# Patient Record
Sex: FEMALE | Race: WHITE | Employment: OTHER | RURAL
[De-identification: names, ages, dates, MRNs, and addresses within clinical notes are randomized per-mention and may not be internally consistent; named-entity substitution may affect disease eponyms.]

---

## 2019-05-27 ENCOUNTER — HOSPITAL ENCOUNTER (OUTPATIENT)
Facility: HOSPITAL | Age: 59
Discharge: HOME | End: 2019-05-27
Payer: COMMERCIAL

## 2019-05-27 LAB
A/G RATIO: 1.7 (ref 0.8–2)
ALBUMIN SERPL-MCNC: 4.3 G/DL (ref 3.4–4.8)
ALP BLD-CCNC: 163 U/L (ref 25–100)
ALT SERPL-CCNC: 14 U/L (ref 4–36)
ANION GAP SERPL CALCULATED.3IONS-SCNC: 14 MMOL/L (ref 3–16)
AST SERPL-CCNC: 18 U/L (ref 8–33)
BASOPHILS ABSOLUTE: 0 K/UL (ref 0–0.1)
BASOPHILS RELATIVE PERCENT: 0.5 %
BILIRUB SERPL-MCNC: 0.4 MG/DL (ref 0.3–1.2)
BUN BLDV-MCNC: 23 MG/DL (ref 6–20)
CALCIUM SERPL-MCNC: 9.4 MG/DL (ref 8.5–10.5)
CHLORIDE BLD-SCNC: 103 MMOL/L (ref 98–107)
CHOLESTEROL, TOTAL: 231 MG/DL (ref 0–200)
CO2: 23 MMOL/L (ref 20–30)
CREAT SERPL-MCNC: 1.4 MG/DL (ref 0.4–1.2)
EOSINOPHILS ABSOLUTE: 0.1 K/UL (ref 0–0.4)
EOSINOPHILS RELATIVE PERCENT: 1.6 %
FOLATE: 11.31 NG/ML
GFR AFRICAN AMERICAN: 47
GFR NON-AFRICAN AMERICAN: 39
GLOBULIN: 2.5 G/DL
GLUCOSE BLD-MCNC: 123 MG/DL (ref 74–106)
HBA1C MFR BLD: 5.6 %
HCT VFR BLD CALC: 39.8 % (ref 37–47)
HDLC SERPL-MCNC: 36 MG/DL (ref 40–60)
HEMOGLOBIN: 12.8 G/DL (ref 11.5–16.5)
IMMATURE GRANULOCYTES #: 0 K/UL
IMMATURE GRANULOCYTES %: 0.5 % (ref 0–5)
LDL CHOLESTEROL CALCULATED: 169 MG/DL
LYMPHOCYTES ABSOLUTE: 1.8 K/UL (ref 1.5–4)
LYMPHOCYTES RELATIVE PERCENT: 27.8 %
MCH RBC QN AUTO: 27.8 PG (ref 27–32)
MCHC RBC AUTO-ENTMCNC: 32.2 G/DL (ref 31–35)
MCV RBC AUTO: 86.5 FL (ref 80–100)
MONOCYTES ABSOLUTE: 0.3 K/UL (ref 0.2–0.8)
MONOCYTES RELATIVE PERCENT: 5 %
NEUTROPHILS ABSOLUTE: 4.2 K/UL (ref 2–7.5)
NEUTROPHILS RELATIVE PERCENT: 64.6 %
PDW BLD-RTO: 12.7 % (ref 11–16)
PLATELET # BLD: 276 K/UL (ref 150–400)
PMV BLD AUTO: 10.9 FL (ref 6–10)
POTASSIUM SERPL-SCNC: 4.4 MMOL/L (ref 3.4–5.1)
RBC # BLD: 4.6 M/UL (ref 3.8–5.8)
SEDIMENTATION RATE, ERYTHROCYTE: 20 MM/HR (ref 0–20)
SODIUM BLD-SCNC: 140 MMOL/L (ref 136–145)
TOTAL PROTEIN: 6.8 G/DL (ref 6.4–8.3)
TRIGL SERPL-MCNC: 129 MG/DL (ref 0–249)
TSH SERPL DL<=0.05 MIU/L-ACNC: 0.45 UIU/ML (ref 0.35–5.5)
URIC ACID, SERUM: 8 MG/DL (ref 2.5–7.1)
VITAMIN B-12: 324 PG/ML (ref 211–911)
VLDLC SERPL CALC-MCNC: 26 MG/DL
WBC # BLD: 6.4 K/UL (ref 4–11)

## 2019-05-27 PROCEDURE — 84550 ASSAY OF BLOOD/URIC ACID: CPT

## 2019-05-27 PROCEDURE — 36415 COLL VENOUS BLD VENIPUNCTURE: CPT

## 2019-05-27 PROCEDURE — 86038 ANTINUCLEAR ANTIBODIES: CPT

## 2019-05-27 PROCEDURE — 80061 LIPID PANEL: CPT

## 2019-05-27 PROCEDURE — 83036 HEMOGLOBIN GLYCOSYLATED A1C: CPT

## 2019-05-27 PROCEDURE — 84443 ASSAY THYROID STIM HORMONE: CPT

## 2019-05-27 PROCEDURE — 80053 COMPREHEN METABOLIC PANEL: CPT

## 2019-05-27 PROCEDURE — 86431 RHEUMATOID FACTOR QUANT: CPT

## 2019-05-27 PROCEDURE — 82607 VITAMIN B-12: CPT

## 2019-05-27 PROCEDURE — 85652 RBC SED RATE AUTOMATED: CPT

## 2019-05-27 PROCEDURE — 82746 ASSAY OF FOLIC ACID SERUM: CPT

## 2019-05-27 PROCEDURE — 85025 COMPLETE CBC W/AUTO DIFF WBC: CPT

## 2019-05-28 LAB — RHEUMATOID FACTOR: <10 IU/ML

## 2019-05-29 LAB — ANTI-NUCLEAR ANTIBODY (ANA): NEGATIVE

## 2020-01-27 ENCOUNTER — HOSPITAL ENCOUNTER (OUTPATIENT)
Facility: HOSPITAL | Age: 60
Discharge: HOME | End: 2020-01-27
Payer: COMMERCIAL

## 2020-01-27 LAB
A/G RATIO: 1.9 (ref 0.8–2)
ALBUMIN SERPL-MCNC: 4.6 G/DL (ref 3.4–4.8)
ALP BLD-CCNC: 156 U/L (ref 25–100)
ALT SERPL-CCNC: 11 U/L (ref 4–36)
ANION GAP SERPL CALCULATED.3IONS-SCNC: 10 MMOL/L (ref 3–16)
AST SERPL-CCNC: 18 U/L (ref 8–33)
BASOPHILS ABSOLUTE: 0 K/UL (ref 0–0.1)
BASOPHILS RELATIVE PERCENT: 0.6 %
BILIRUB SERPL-MCNC: 0.4 MG/DL (ref 0.3–1.2)
BUN BLDV-MCNC: 28 MG/DL (ref 6–20)
CALCIUM SERPL-MCNC: 9.4 MG/DL (ref 8.5–10.5)
CHLORIDE BLD-SCNC: 105 MMOL/L (ref 98–107)
CHOLESTEROL, TOTAL: 259 MG/DL (ref 0–200)
CO2: 25 MMOL/L (ref 20–30)
CREAT SERPL-MCNC: 1.3 MG/DL (ref 0.4–1.2)
EOSINOPHILS ABSOLUTE: 0.1 K/UL (ref 0–0.4)
EOSINOPHILS RELATIVE PERCENT: 2.3 %
FOLATE: 10.69 NG/ML
GFR AFRICAN AMERICAN: 51
GFR NON-AFRICAN AMERICAN: 42
GLOBULIN: 2.4 G/DL
GLUCOSE BLD-MCNC: 92 MG/DL (ref 74–106)
HBA1C MFR BLD: 5.9 %
HCT VFR BLD CALC: 39.2 % (ref 37–47)
HDLC SERPL-MCNC: 44 MG/DL (ref 40–60)
HEMOGLOBIN: 12.3 G/DL (ref 11.5–16.5)
IMMATURE GRANULOCYTES #: 0 K/UL
IMMATURE GRANULOCYTES %: 0.4 % (ref 0–5)
LDL CHOLESTEROL CALCULATED: 195 MG/DL
LYMPHOCYTES ABSOLUTE: 1.5 K/UL (ref 1.5–4)
LYMPHOCYTES RELATIVE PERCENT: 30.6 %
MCH RBC QN AUTO: 28.1 PG (ref 27–32)
MCHC RBC AUTO-ENTMCNC: 31.4 G/DL (ref 31–35)
MCV RBC AUTO: 89.5 FL (ref 80–100)
MONOCYTES ABSOLUTE: 0.3 K/UL (ref 0.2–0.8)
MONOCYTES RELATIVE PERCENT: 6.6 %
NEUTROPHILS ABSOLUTE: 2.9 K/UL (ref 2–7.5)
NEUTROPHILS RELATIVE PERCENT: 59.5 %
PDW BLD-RTO: 13 % (ref 11–16)
PLATELET # BLD: 256 K/UL (ref 150–400)
PMV BLD AUTO: 11.6 FL (ref 6–10)
POTASSIUM SERPL-SCNC: 4.2 MMOL/L (ref 3.4–5.1)
RBC # BLD: 4.38 M/UL (ref 3.8–5.8)
SODIUM BLD-SCNC: 140 MMOL/L (ref 136–145)
TOTAL PROTEIN: 7 G/DL (ref 6.4–8.3)
TRIGL SERPL-MCNC: 98 MG/DL (ref 0–249)
TSH SERPL DL<=0.05 MIU/L-ACNC: 0.25 UIU/ML (ref 0.35–5.5)
VITAMIN B-12: 290 PG/ML (ref 211–911)
VITAMIN D 25-HYDROXY: 14.4 (ref 32–100)
VLDLC SERPL CALC-MCNC: 20 MG/DL
WBC # BLD: 4.8 K/UL (ref 4–11)

## 2020-01-27 PROCEDURE — 83036 HEMOGLOBIN GLYCOSYLATED A1C: CPT

## 2020-01-27 PROCEDURE — 83001 ASSAY OF GONADOTROPIN (FSH): CPT

## 2020-01-27 PROCEDURE — 82607 VITAMIN B-12: CPT

## 2020-01-27 PROCEDURE — 83002 ASSAY OF GONADOTROPIN (LH): CPT

## 2020-01-27 PROCEDURE — 82306 VITAMIN D 25 HYDROXY: CPT

## 2020-01-27 PROCEDURE — 80061 LIPID PANEL: CPT

## 2020-01-27 PROCEDURE — 36415 COLL VENOUS BLD VENIPUNCTURE: CPT

## 2020-01-27 PROCEDURE — 84443 ASSAY THYROID STIM HORMONE: CPT

## 2020-01-27 PROCEDURE — 82746 ASSAY OF FOLIC ACID SERUM: CPT

## 2020-01-27 PROCEDURE — 80053 COMPREHEN METABOLIC PANEL: CPT

## 2020-01-27 PROCEDURE — 85025 COMPLETE CBC W/AUTO DIFF WBC: CPT

## 2020-01-27 PROCEDURE — 82671 ASSAY OF ESTROGENS: CPT

## 2020-01-28 LAB
FOLLICLE STIMULATING HORMONE: 136.4 MIU/ML
LUTEINIZING HORMONE: 72.3 MIU/ML

## 2020-01-31 LAB
ESTRADIOL LEVEL: 13.3 PG/ML
ESTROGEN TOTAL: 40.7 PG/ML
ESTRONE: 27.4 PG/ML

## 2020-03-06 ENCOUNTER — HOSPITAL ENCOUNTER (OUTPATIENT)
Facility: HOSPITAL | Age: 60
Discharge: HOME | End: 2020-03-06
Payer: COMMERCIAL

## 2020-03-06 ENCOUNTER — CONSULT (OUTPATIENT)
Dept: CARDIOLOGY | Facility: CLINIC | Age: 60
End: 2020-03-06

## 2020-03-06 VITALS
HEART RATE: 62 BPM | RESPIRATION RATE: 18 BRPM | HEIGHT: 62 IN | SYSTOLIC BLOOD PRESSURE: 124 MMHG | BODY MASS INDEX: 43.98 KG/M2 | DIASTOLIC BLOOD PRESSURE: 80 MMHG | OXYGEN SATURATION: 99 % | WEIGHT: 239 LBS

## 2020-03-06 DIAGNOSIS — I44.7 LBBB (LEFT BUNDLE BRANCH BLOCK): ICD-10-CM

## 2020-03-06 DIAGNOSIS — R53.83 FATIGUE, UNSPECIFIED TYPE: ICD-10-CM

## 2020-03-06 DIAGNOSIS — G47.9 SLEEP DISTURBANCE: ICD-10-CM

## 2020-03-06 DIAGNOSIS — I20.8 ANGINAL EQUIVALENT (HCC): Primary | ICD-10-CM

## 2020-03-06 DIAGNOSIS — E78.2 MIXED HYPERLIPIDEMIA: ICD-10-CM

## 2020-03-06 PROBLEM — E05.90 HYPERTHYROIDISM: Status: ACTIVE | Noted: 2020-03-06

## 2020-03-06 PROCEDURE — 99244 OFF/OP CNSLTJ NEW/EST MOD 40: CPT | Performed by: INTERNAL MEDICINE

## 2020-03-06 PROCEDURE — 93005 ELECTROCARDIOGRAM TRACING: CPT

## 2020-03-06 PROCEDURE — 93000 ELECTROCARDIOGRAM COMPLETE: CPT | Performed by: INTERNAL MEDICINE

## 2020-03-06 RX ORDER — AMLODIPINE, VALSARTAN AND HYDROCHLOROTHIAZIDE 5; 160; 12.5 MG/1; MG/1; MG/1
TABLET ORAL
Status: ON HOLD | COMMUNITY
Start: 2020-01-14 | End: 2021-05-13

## 2020-03-06 RX ORDER — CHOLECALCIFEROL (VITAMIN D3) 125 MCG
5-10 CAPSULE ORAL NIGHTLY PRN
COMMUNITY

## 2020-03-06 RX ORDER — LEVOTHYROXINE SODIUM 88 UG/1
88 TABLET ORAL
Status: ON HOLD | COMMUNITY
Start: 2020-02-03 | End: 2021-05-13

## 2020-03-06 RX ORDER — ROSUVASTATIN CALCIUM 10 MG/1
10 TABLET, COATED ORAL DAILY
Qty: 90 TABLET | Refills: 3 | Status: SHIPPED | OUTPATIENT
Start: 2020-03-06 | End: 2020-12-28

## 2020-03-06 RX ORDER — CELECOXIB 200 MG/1
200 CAPSULE ORAL 2 TIMES DAILY
COMMUNITY

## 2020-03-06 RX ORDER — HYDROXYZINE HYDROCHLORIDE 25 MG/1
25 TABLET, FILM COATED ORAL EVERY 8 HOURS PRN
COMMUNITY
End: 2022-08-01 | Stop reason: SDDI

## 2020-03-06 RX ORDER — LORATADINE 10 MG/1
10 CAPSULE, LIQUID FILLED ORAL DAILY
Status: ON HOLD | COMMUNITY
End: 2021-05-13

## 2020-03-06 RX ORDER — TIZANIDINE 4 MG/1
4 TABLET ORAL NIGHTLY PRN
COMMUNITY

## 2020-03-06 RX ORDER — CITALOPRAM 40 MG/1
40 TABLET ORAL
Status: ON HOLD | COMMUNITY
Start: 2020-01-13 | End: 2021-05-13

## 2020-03-06 NOTE — PROGRESS NOTES
[facility] AT [address], [address]  [place], [place], [address] [phone]  WWW.[facility]           OUTPATIENT CLINIC CONSULTATION NOTE    Patient care team:  Patient Care Team:  Sparkle Lopez APRN as PCP - General (Family Medicine)    Requesting Provider and Reason for consultation: The patient is being seen today at the request of CHENCHO Fitch for hyperlipidemia, fatigue.     Subjective:   Chief complaint:   Chief Complaint   Patient presents with   • Palpitations   • Rapid Heart Rate       HPI:    Ramirez Gan is a 60 y.o. female.  Cardiac problem list:  1. Worsening fatigue, 3/2020  2. Newly diagnosed left bundle branch block 3/2020  3. Mixed hyperlipidemia,  3/2020 lightheadedness with pravastatin  4. History of sleep apnea, did not tolerate CPAP, possible  5. Obesity, status post prior gastric surgery    Today the patient presents for consultation regarding ongoing fatigue and other cardiac risk factors.    The patient has noted over the last few months worsening fatigue, decreased energy.  Not associated with chest pain or dyspnea.  Is noting that she does not sleep well.  At times wakes up with a panic-like feeling.  She is told she snores.  Does not wake up well rested.    Was diagnosed with sleep apnea long ago but did not tolerate her CPAP.  Prior stress testing many years ago before having gastric bypass.  Reportedly negative.  Has worn a heart monitor in the past.  No prior catheter echo.    Several months ago the patient tried pravastatin alongside allopurinol at the same time and had significant dizziness.  She did not try either 1 in isolation to try to identify which one was causing her the problems.    Her father reportedly  from an MI at the age of 42, her mother had her first heart problems in her 50s, her brother  of a heart attack at the age of 61    The patient does not exercise regularly    Review of  "Systems:  Positive for fatigue, sleep disturbances possible PND  Negative for exertional chest pain, dyspnea with exertion, orthopnea, lower extremity edema, palpitations, lightheadedness, syncope.   All other systems reviewed and are negative.    PFSH:  Patient Active Problem List   Diagnosis   • Mixed hyperlipidemia   • Hyperthyroidism   • LBBB (left bundle branch block)         Current Outpatient Medications:   •  amLODIPine-Valsartan-HCTZ 5-160-12.5 MG tablet, , Disp: , Rfl:   •  celecoxib (CeleBREX) 200 MG capsule, Take 200 mg by mouth 2 (Two) Times a Day., Disp: , Rfl:   •  citalopram (CeleXA) 40 MG tablet, 40 mg., Disp: , Rfl:   •  hydrOXYzine (ATARAX) 25 MG tablet, Take 25 mg by mouth Every 8 (Eight) Hours As Needed., Disp: , Rfl:   •  levothyroxine (SYNTHROID, LEVOTHROID) 88 MCG tablet, 88 mcg., Disp: , Rfl:   •  Loratadine 10 MG capsule, Take 10 mg by mouth Daily., Disp: , Rfl:   •  melatonin 5 MG tablet tablet, Take 10 mg by mouth At Night As Needed., Disp: , Rfl:   •  tiZANidine (ZANAFLEX) 4 MG tablet, Take 4 mg by mouth At Night As Needed for Muscle Spasms., Disp: , Rfl:   •  rosuvastatin (CRESTOR) 10 MG tablet, Take 1 tablet by mouth Daily., Disp: 90 tablet, Rfl: 3    No Known Allergies    Social History     Socioeconomic History   • Marital status:      Spouse name: Not on file   • Number of children: Not on file   • Years of education: Not on file   • Highest education level: Not on file   Tobacco Use   • Smoking status: Never Smoker   • Smokeless tobacco: Never Used   Substance and Sexual Activity   • Alcohol use: Never     Frequency: Never   • Drug use: Never     Family History   Problem Relation Age of Onset   • Heart attack Mother    • Heart failure Father    • Heart attack Brother          Objective:   Physical Exam:  /80 (BP Location: Right arm, Patient Position: Sitting, Cuff Size: Large Adult)   Pulse 62   Resp 18   Ht 157.5 cm (62\")   Wt 108 kg (239 lb)   SpO2 99%   BMI " 43.71 kg/m²   CONSTITUTIONAL: Well-nourished. In no acute distress.   SKIN: Warm and dry. No rashes noted  HEENT: Head is normocephalic and atraumatic.  Mucous membranes are pink and moist.   NECK: Supple without masses or thyromegaly. There is no jugular venous distention at 30°.  LUNGS: Normal effort. Clear to auscultation bilaterally without wheezing, rhonchi, or rales noted.   CARDIOVASCULAR: The heart has a regular rate and rhythm with a normal S1 and S2. There is no murmur, gallop, rub, or click appreciated.   PERIPHERAL VASCULAR: Carotid upstroke is 2+ bilaterally and without bruits. Radial pulses are 2+ bilaterally. There is no lower extremity edema.   ABDOMEN: Normal bowel sounds.  Soft with no tenderness with palpitation. No hepatosplenomegaly  MUSCULOSKELETAL: Normal gait. No digital cyanosis  NEUROLOGICAL: CN II - XII grossly intact  PSYCHIATRIC: Alert, orientated x 3, appropriate affect     3/2020 exam: 2+ DP pulses bilaterally    Labs:  No results found for: BUN, CREATININE, K, ALT, AST  WBC   Date Value Ref Range Status   01/27/2020 4.8 4.0 - 11.0 K/uL Final     Hemoglobin   Date Value Ref Range Status   01/27/2020 12.3 11.5 - 16.5 g/dL Final     Hematocrit   Date Value Ref Range Status   01/27/2020 39.2 37.0 - 47.0 % Final     Platelets   Date Value Ref Range Status   01/27/2020 256 150 - 400 K/uL Final       No results found for: CHOL  Lab Results   Component Value Date    TRIG 98 01/27/2020    TRIG 129 05/27/2019     Lab Results   Component Value Date    HDL 44 01/27/2020    HDL 36 (L) 05/27/2019     Lab Results   Component Value Date     (H) 01/27/2020     No components found for: LDLDIRECTC    Outside facility labs scanned into media tab, 1/2020, reviewed by myself: , TG 98, HDL 44, , creatinine 1.3, TSH 0.25.  Hemoglobin A1c, liver enzymes and CBC within normal limits    Diagnostic Data:      ECG 12 Lead  Date/Time: 3/6/2020 10:44 AM  Performed by: Jason Estrada,  MD  Authorized by: Jason Estrada MD   Comparison: not compared with previous ECG   Rhythm: sinus rhythm  Conduction: left bundle branch block  Comments: Heart rate 61, , QTc 438            Assessment and Plan:   Ramirez was seen today for palpitations and rapid heart rate.    Diagnoses and all orders for this visit:    Fatigue, unspecified type  Anginal equivalent (CMS/HCC)  LBBB (left bundle branch block)  Sleep disturbances, history of HANS  Mixed hyperlipidemia  -The patient has been having increasing fatigue.  Not very active.  Has sleep disturbances.  Has never been told she has a left bundle branch block.  Has a family history of premature coronary artery disease.  LDL significantly elevated greater than 190  -Lexiscan nuclear stress test  -Strongly recommended increasing her activity level, exercising, and starting to move toward a Mediterranean heart healthy diet  -Possibly had side effects from pravastatin in the past, patient agreeable to trial of Crestor 10 mg nightly  -Repeat lipid panel and LFTs in 3 months  -Referral to sleep medicine      - Return in about 3 months (around 6/6/2020).    Jason Estrada MD, MSc, FACC  Interventional Cardiology  [facility]

## 2020-03-19 ENCOUNTER — HOSPITAL ENCOUNTER (OUTPATIENT)
Dept: NON INVASIVE DIAGNOSTICS | Facility: HOSPITAL | Age: 60
Discharge: HOME | End: 2020-03-19
Payer: COMMERCIAL

## 2020-03-19 ENCOUNTER — OUTSIDE FACILITY SERVICE (OUTPATIENT)
Dept: CARDIOLOGY | Facility: CLINIC | Age: 60
End: 2020-03-19

## 2020-03-19 LAB
LV EF: 72 %
LVEF MODALITY: NORMAL

## 2020-03-19 PROCEDURE — 6360000002 HC RX W HCPCS: Performed by: INTERNAL MEDICINE

## 2020-03-19 PROCEDURE — 93018 CV STRESS TEST I&R ONLY: CPT | Performed by: INTERNAL MEDICINE

## 2020-03-19 PROCEDURE — 78452 HT MUSCLE IMAGE SPECT MULT: CPT | Performed by: INTERNAL MEDICINE

## 2020-03-19 PROCEDURE — A9500 TC99M SESTAMIBI: HCPCS | Performed by: INTERNAL MEDICINE

## 2020-03-19 PROCEDURE — 3430000000 HC RX DIAGNOSTIC RADIOPHARMACEUTICAL: Performed by: INTERNAL MEDICINE

## 2020-03-19 PROCEDURE — 96374 THER/PROPH/DIAG INJ IV PUSH: CPT

## 2020-03-19 RX ADMIN — REGADENOSON 0.4 MG: 0.08 INJECTION, SOLUTION INTRAVENOUS at 10:50

## 2020-03-19 RX ADMIN — TETRAKIS(2-METHOXYISOBUTYLISOCYANIDE)COPPER(I) TETRAFLUOROBORATE 10.8 MILLICURIE: 1 INJECTION, POWDER, LYOPHILIZED, FOR SOLUTION INTRAVENOUS at 15:42

## 2020-03-19 RX ADMIN — TETRAKIS(2-METHOXYISOBUTYLISOCYANIDE)COPPER(I) TETRAFLUOROBORATE 35.3 MILLICURIE: 1 INJECTION, POWDER, LYOPHILIZED, FOR SOLUTION INTRAVENOUS at 15:42

## 2020-03-20 ENCOUNTER — TELEPHONE (OUTPATIENT)
Dept: CARDIOLOGY | Facility: CLINIC | Age: 60
End: 2020-03-20

## 2020-03-20 NOTE — TELEPHONE ENCOUNTER
Patient contacted to review recent stress test results. Pt verbalizes understanding, all questions answered at this time.

## 2020-03-20 NOTE — TELEPHONE ENCOUNTER
----- Message from Jason Estrada MD sent at 3/20/2020 12:28 PM EDT -----  Please let the patient know her stress test was negative for evidence of significant blockage.

## 2020-12-18 ENCOUNTER — OFFICE VISIT (OUTPATIENT)
Dept: CARDIOLOGY | Facility: CLINIC | Age: 60
End: 2020-12-18

## 2020-12-18 VITALS
BODY MASS INDEX: 44.72 KG/M2 | HEIGHT: 62 IN | SYSTOLIC BLOOD PRESSURE: 138 MMHG | HEART RATE: 78 BPM | WEIGHT: 243 LBS | DIASTOLIC BLOOD PRESSURE: 70 MMHG

## 2020-12-18 DIAGNOSIS — I44.7 LBBB (LEFT BUNDLE BRANCH BLOCK): ICD-10-CM

## 2020-12-18 DIAGNOSIS — G47.9 SLEEP DISTURBANCE: ICD-10-CM

## 2020-12-18 DIAGNOSIS — E78.2 MIXED HYPERLIPIDEMIA: ICD-10-CM

## 2020-12-18 DIAGNOSIS — R53.83 FATIGUE, UNSPECIFIED TYPE: Primary | ICD-10-CM

## 2020-12-18 PROCEDURE — 99214 OFFICE O/P EST MOD 30 MIN: CPT | Performed by: INTERNAL MEDICINE

## 2020-12-18 RX ORDER — BISOPROLOL FUMARATE 5 MG/1
2.5 TABLET, FILM COATED ORAL DAILY
Qty: 30 TABLET | Refills: 5 | Status: ON HOLD | OUTPATIENT
Start: 2020-12-18 | End: 2021-05-13

## 2020-12-18 NOTE — PROGRESS NOTES
[facility]   [address], [address]  [place], [place], [address] [phone]  [facility]           OUTPATIENT CLINIC FOLLOW UP NOTE    Patient Care Team:  Patient Care Team:  Sparkle Lopez APRN as PCP - General (Family Medicine)    Subjective:      Chief Complaint   Patient presents with   • Hyperlipidemia       HPI:    Ramirez Gan is a 60 y.o. female.  Problem list:  1. Worsening fatigue, 3/2020  2. Newly diagnosed left bundle branch block 3/2020  3. Mixed hyperlipidemia,  3/2020 lightheadedness with pravastatin  4. History of sleep apnea, did not tolerate CPAP, possible  5. Obesity, status post prior gastric surgery    She presents today for follow-up.    Since the patient was last seen she reports that she has had increased anxiety and migraines.  She also reports with any physical activity she becomes diaphoretic.  She denies chest pain or shortness of breath.  She has been tolerating Crestor without difficulty.  She has not had a repeat lipid panel.  She has not been able to follow-up with sleep medicine due to COVID-19.  Pressure has been stable at home.    Review of Systems:  Positive for diaphoresis, anxiety, migraines  Negative for exertional chest pain, dyspnea with exertion, orthopnea, PND, lower extremity edema, palpitations, lightheadedness, syncope.     PFSH:  Patient Active Problem List   Diagnosis   • Mixed hyperlipidemia   • Hyperthyroidism   • LBBB (left bundle branch block)         Current Outpatient Medications:   •  amLODIPine-Valsartan-HCTZ 5-160-12.5 MG tablet, , Disp: , Rfl:   •  celecoxib (CeleBREX) 200 MG capsule, Take 200 mg by mouth 2 (Two) Times a Day., Disp: , Rfl:   •  citalopram (CeleXA) 40 MG tablet, 40 mg., Disp: , Rfl:   •  hydrOXYzine (ATARAX) 25 MG tablet, Take 25 mg by mouth Every 8 (Eight) Hours As Needed., Disp: , Rfl:   •  levothyroxine (SYNTHROID, LEVOTHROID) 88 MCG tablet, 88 mcg., Disp: , Rfl:   •   "Loratadine 10 MG capsule, Take 10 mg by mouth Daily., Disp: , Rfl:   •  melatonin 5 MG tablet tablet, Take 10 mg by mouth At Night As Needed., Disp: , Rfl:   •  rosuvastatin (CRESTOR) 10 MG tablet, Take 1 tablet by mouth Daily., Disp: 90 tablet, Rfl: 3  •  tiZANidine (ZANAFLEX) 4 MG tablet, Take 4 mg by mouth At Night As Needed for Muscle Spasms., Disp: , Rfl:   •  bisoprolol (ZEBeta) 5 MG tablet, Take 0.5 tablets by mouth Daily., Disp: 30 tablet, Rfl: 5    No Known Allergies     reports that she has never smoked. She has never used smokeless tobacco.    Family History   Problem Relation Age of Onset   • Heart attack Mother    • Heart failure Father    • Heart attack Brother          Objective:   Physical exam:  /70   Pulse 78   Ht 157.5 cm (62\")   Wt 110 kg (243 lb)   BMI 44.45 kg/m²   CONSTITUTIONAL: No acute distress  RESPIRATORY: Normal effort. Clear to auscultation bilaterally without wheezing or rales  CARDIOVASCULAR: Carotids with normal upstrokes without bruits.  Regular rate and rhythm with normal S1 and S2. Without murmur, gallop or rub. Normal radial pulse. There is no lower extremity edema bilaterally.  PSYCH: Normal affect and mood    Labs:    No results found for: BUN, CREATININE, K, ALT, AST    No results found for: CHOL  Lab Results   Component Value Date    TRIG 98 01/27/2020    TRIG 129 05/27/2019     Lab Results   Component Value Date    HDL 44 01/27/2020    HDL 36 (L) 05/27/2019     Lab Results   Component Value Date     (H) 01/27/2020     No components found for: LDLDIRECTC    Diagnostic Data:    Procedures    Nuclear stress testing 3/2020 Tim Rothman  -Left ventricular ejection fraction of 72%.  -There is normal isotope uptake at stress and rest.  There is no evidence of myocardial ischemia or scar.  -There is a fixed anteroseptal defect consistent with breast attenuation artifact.  -Impressions are consistent with a low risk stress test.         Assessment and Plan: "   Diagnoses and all orders for this visit:    Fatigue, unspecified type (Primary)  LBBB (left bundle branch block)  Sleep disturbance  Mixed hyperlipidemia  -Nuclear stress testing was low risk in 3/2020.  -Patient has been tolerating Crestor.  Repeat lipid panel and LFTs.  Will consider increase pending results.  External form given today  -Patient reeducated on some signs of angina including exertional chest pain/tightness/pressure and exertional dyspnea/fatigue.  -Low threshold heart catheterization given strong family history and risk factors.  Discussed heart catheterization with the patient again today.  -Patient to reschedule sleep medicine appointment.  -Strongly encouraged dietary and lifestyle modifications.  -Activity levels are currently limited by needing to care for her ill       - Return in about 6 months (around 6/18/2021) for Next scheduled follow-up with an ECG.    Scribed for Jason Estrada MD by CHENCHO Pepper. 12/18/2020  12:39 EST    I, aJson Estrada MD, personally performed the services as scribed by the above named individual. I have made any necessary edits and it is both accurate and complete.     Jason Estrada MD, MSc, FACC, [facility]  Interventional Cardiology  [facility]

## 2020-12-23 ENCOUNTER — TELEPHONE (OUTPATIENT)
Dept: CARDIOLOGY | Facility: CLINIC | Age: 60
End: 2020-12-23

## 2020-12-28 RX ORDER — ROSUVASTATIN CALCIUM 10 MG/1
10 TABLET, COATED ORAL DAILY
Qty: 90 TABLET | Refills: 0 | Status: SHIPPED | OUTPATIENT
Start: 2020-12-28 | End: 2021-11-24

## 2021-04-05 ENCOUNTER — TELEPHONE (OUTPATIENT)
Dept: CARDIOLOGY | Facility: CLINIC | Age: 61
End: 2021-04-05

## 2021-04-05 DIAGNOSIS — E78.2 MIXED HYPERLIPIDEMIA: Primary | ICD-10-CM

## 2021-04-05 DIAGNOSIS — I20.8 ANGINAL EQUIVALENT (HCC): ICD-10-CM

## 2021-04-06 NOTE — TELEPHONE ENCOUNTER
Pt called and stated that she has had several episodes of sweating and chest heaviness, but on Saturday she had a more intense episode.  She stated she had intense cold sweats starting from head down her body, chest felt heavier than previous episodes, and she started hurting in shoulders and between shoulder blades, as well as extreme weakness. Patient also reports SOB.     Patient denies current chest pain/heaviness, SOB, diaphoresis.    Pt open to having heart cath.     Please advise.

## 2021-04-07 NOTE — TELEPHONE ENCOUNTER
LVM requesting return call to discuss heart cath. Will await return call.       Will reach out to PCP regarding last creatinine level.

## 2021-04-08 NOTE — TELEPHONE ENCOUNTER
Spoke with patient regarding recommendations. Patient has had no recent labs. Patient advised she will need to have repeat Creatinine. Order faxed to PCP office.

## 2021-04-09 ENCOUNTER — HOSPITAL ENCOUNTER (OUTPATIENT)
Facility: HOSPITAL | Age: 61
Discharge: HOME | End: 2021-04-09
Payer: MEDICARE

## 2021-04-09 LAB
A/G RATIO: 2 (ref 0.8–2)
ALBUMIN SERPL-MCNC: 4.5 G/DL (ref 3.4–4.8)
ALP BLD-CCNC: 139 U/L (ref 25–100)
ALT SERPL-CCNC: 11 U/L (ref 4–36)
ANION GAP SERPL CALCULATED.3IONS-SCNC: 14 MMOL/L (ref 3–16)
AST SERPL-CCNC: 18 U/L (ref 8–33)
BASOPHILS ABSOLUTE: 0 K/UL (ref 0–0.1)
BASOPHILS RELATIVE PERCENT: 0.7 %
BILIRUB SERPL-MCNC: 0.4 MG/DL (ref 0.3–1.2)
BUN BLDV-MCNC: 22 MG/DL (ref 6–20)
CALCIUM SERPL-MCNC: 9.2 MG/DL (ref 8.5–10.5)
CHLORIDE BLD-SCNC: 103 MMOL/L (ref 98–107)
CHOLESTEROL, TOTAL: 164 MG/DL (ref 0–200)
CO2: 21 MMOL/L (ref 20–30)
CREAT SERPL-MCNC: 1.3 MG/DL (ref 0.4–1.2)
EOSINOPHILS ABSOLUTE: 0.4 K/UL (ref 0–0.4)
EOSINOPHILS RELATIVE PERCENT: 6.8 %
FOLATE: 9.25 NG/ML
GFR AFRICAN AMERICAN: 51
GFR NON-AFRICAN AMERICAN: 42
GLOBULIN: 2.3 G/DL
GLUCOSE BLD-MCNC: 137 MG/DL (ref 74–106)
HBA1C MFR BLD: 5.9 %
HCT VFR BLD CALC: 40.8 % (ref 37–47)
HDLC SERPL-MCNC: 48 MG/DL (ref 40–60)
HEMOGLOBIN: 12.8 G/DL (ref 11.5–16.5)
IMMATURE GRANULOCYTES #: 0 K/UL
IMMATURE GRANULOCYTES %: 0.6 % (ref 0–5)
LDL CHOLESTEROL CALCULATED: 92 MG/DL
LYMPHOCYTES ABSOLUTE: 1.5 K/UL (ref 1.5–4)
LYMPHOCYTES RELATIVE PERCENT: 27.4 %
MCH RBC QN AUTO: 28.2 PG (ref 27–32)
MCHC RBC AUTO-ENTMCNC: 31.4 G/DL (ref 31–35)
MCV RBC AUTO: 89.9 FL (ref 80–100)
MONOCYTES ABSOLUTE: 0.3 K/UL (ref 0.2–0.8)
MONOCYTES RELATIVE PERCENT: 5.9 %
NEUTROPHILS ABSOLUTE: 3.2 K/UL (ref 2–7.5)
NEUTROPHILS RELATIVE PERCENT: 58.6 %
PDW BLD-RTO: 13.6 % (ref 11–16)
PLATELET # BLD: 229 K/UL (ref 150–400)
PMV BLD AUTO: 12.1 FL (ref 6–10)
POTASSIUM SERPL-SCNC: 4.1 MMOL/L (ref 3.4–5.1)
RBC # BLD: 4.54 M/UL (ref 3.8–5.8)
SODIUM BLD-SCNC: 138 MMOL/L (ref 136–145)
TOTAL PROTEIN: 6.8 G/DL (ref 6.4–8.3)
TRIGL SERPL-MCNC: 122 MG/DL (ref 0–249)
TSH SERPL DL<=0.05 MIU/L-ACNC: 51.25 UIU/ML (ref 0.27–4.2)
VITAMIN B-12: >2000 PG/ML (ref 211–911)
VITAMIN D 25-HYDROXY: 27.1 (ref 32–100)
VLDLC SERPL CALC-MCNC: 24 MG/DL
WBC # BLD: 5.4 K/UL (ref 4–11)

## 2021-04-09 PROCEDURE — 80061 LIPID PANEL: CPT

## 2021-04-09 PROCEDURE — 82607 VITAMIN B-12: CPT

## 2021-04-09 PROCEDURE — 83036 HEMOGLOBIN GLYCOSYLATED A1C: CPT

## 2021-04-09 PROCEDURE — 82306 VITAMIN D 25 HYDROXY: CPT

## 2021-04-09 PROCEDURE — 85025 COMPLETE CBC W/AUTO DIFF WBC: CPT

## 2021-04-09 PROCEDURE — 82746 ASSAY OF FOLIC ACID SERUM: CPT

## 2021-04-09 PROCEDURE — 84443 ASSAY THYROID STIM HORMONE: CPT

## 2021-04-09 PROCEDURE — 80053 COMPREHEN METABOLIC PANEL: CPT

## 2021-04-21 ENCOUNTER — TELEPHONE (OUTPATIENT)
Dept: CARDIOLOGY | Facility: CLINIC | Age: 61
End: 2021-04-21

## 2021-04-21 NOTE — TELEPHONE ENCOUNTER
----- Message from Jason Estrada MD sent at 4/21/2021  8:08 AM EDT -----  Can your reach back out to this patient? If still intermittently having chest pain, recommend LHC. If not, can wait till follow up to discuss again. Please Rx nitro SL if she doesn't have it. Thanks

## 2021-04-29 PROBLEM — I20.89 ANGINAL EQUIVALENT: Status: ACTIVE | Noted: 2021-04-29

## 2021-04-29 PROBLEM — I20.8 ANGINAL EQUIVALENT: Status: ACTIVE | Noted: 2021-04-29

## 2021-04-30 ENCOUNTER — PREP FOR SURGERY (OUTPATIENT)
Dept: OTHER | Facility: HOSPITAL | Age: 61
End: 2021-04-30

## 2021-04-30 ENCOUNTER — TRANSCRIBE ORDERS (OUTPATIENT)
Dept: LAB | Facility: HOSPITAL | Age: 61
End: 2021-04-30

## 2021-04-30 DIAGNOSIS — Z01.818 PRE-OP EXAMINATION: Primary | ICD-10-CM

## 2021-04-30 DIAGNOSIS — I20.9 ANGINA PECTORIS (HCC): Primary | ICD-10-CM

## 2021-04-30 RX ORDER — SODIUM CHLORIDE 0.9 % (FLUSH) 0.9 %
3 SYRINGE (ML) INJECTION EVERY 12 HOURS SCHEDULED
Status: CANCELLED | OUTPATIENT
Start: 2021-04-30

## 2021-04-30 RX ORDER — ASPIRIN 81 MG/1
81 TABLET ORAL DAILY
Status: CANCELLED | OUTPATIENT
Start: 2021-05-01

## 2021-04-30 RX ORDER — SODIUM CHLORIDE 0.9 % (FLUSH) 0.9 %
10 SYRINGE (ML) INJECTION AS NEEDED
Status: CANCELLED | OUTPATIENT
Start: 2021-04-30

## 2021-04-30 RX ORDER — ASPIRIN 81 MG/1
324 TABLET, CHEWABLE ORAL ONCE
Status: CANCELLED | OUTPATIENT
Start: 2021-04-30 | End: 2021-04-30

## 2021-05-11 ENCOUNTER — LAB (OUTPATIENT)
Dept: LAB | Facility: HOSPITAL | Age: 61
End: 2021-05-11

## 2021-05-11 DIAGNOSIS — Z01.818 PRE-OP EXAMINATION: ICD-10-CM

## 2021-05-11 PROCEDURE — C9803 HOPD COVID-19 SPEC COLLECT: HCPCS

## 2021-05-11 PROCEDURE — U0004 COV-19 TEST NON-CDC HGH THRU: HCPCS

## 2021-05-13 ENCOUNTER — HOSPITAL ENCOUNTER (OUTPATIENT)
Facility: HOSPITAL | Age: 61
Discharge: HOME | End: 2021-05-13
Attending: INTERNAL MEDICINE | Admitting: INTERNAL MEDICINE

## 2021-05-13 VITALS
HEIGHT: 62 IN | RESPIRATION RATE: 15 BRPM | TEMPERATURE: 97.2 F | SYSTOLIC BLOOD PRESSURE: 165 MMHG | HEART RATE: 54 BPM | BODY MASS INDEX: 45.28 KG/M2 | DIASTOLIC BLOOD PRESSURE: 82 MMHG | WEIGHT: 246.03 LBS | OXYGEN SATURATION: 94 %

## 2021-05-13 DIAGNOSIS — I20.8 ANGINAL EQUIVALENT (HCC): ICD-10-CM

## 2021-05-13 DIAGNOSIS — I20.9 ANGINA PECTORIS (HCC): ICD-10-CM

## 2021-05-13 LAB
ANION GAP SERPL CALCULATED.3IONS-SCNC: 12 MMOL/L (ref 5–15)
BUN SERPL-MCNC: 26 MG/DL (ref 8–23)
BUN/CREAT SERPL: 23.2 (ref 7–25)
CALCIUM SPEC-SCNC: 9.1 MG/DL (ref 8.6–10.5)
CHLORIDE SERPL-SCNC: 107 MMOL/L (ref 98–107)
CHOLEST SERPL-MCNC: 148 MG/DL (ref 0–200)
CO2 SERPL-SCNC: 24 MMOL/L (ref 22–29)
CREAT SERPL-MCNC: 1.12 MG/DL (ref 0.57–1)
DEPRECATED RDW RBC AUTO: 44 FL (ref 37–54)
ERYTHROCYTE [DISTWIDTH] IN BLOOD BY AUTOMATED COUNT: 13.3 % (ref 12.3–15.4)
GFR SERPL CREATININE-BSD FRML MDRD: 50 ML/MIN/1.73
GLUCOSE SERPL-MCNC: 96 MG/DL (ref 65–99)
HBA1C MFR BLD: 5.8 % (ref 4.8–5.6)
HCT VFR BLD AUTO: 37 % (ref 34–46.6)
HDLC SERPL-MCNC: 44 MG/DL (ref 40–60)
HGB BLD-MCNC: 11.8 G/DL (ref 12–15.9)
LDLC SERPL CALC-MCNC: 83 MG/DL (ref 0–100)
LDLC/HDLC SERPL: 1.85 {RATIO}
MCH RBC QN AUTO: 28.9 PG (ref 26.6–33)
MCHC RBC AUTO-ENTMCNC: 31.9 G/DL (ref 31.5–35.7)
MCV RBC AUTO: 90.5 FL (ref 79–97)
PLATELET # BLD AUTO: 197 10*3/MM3 (ref 140–450)
PMV BLD AUTO: 10.9 FL (ref 6–12)
POTASSIUM SERPL-SCNC: 4 MMOL/L (ref 3.5–5.2)
RBC # BLD AUTO: 4.09 10*6/MM3 (ref 3.77–5.28)
SODIUM SERPL-SCNC: 143 MMOL/L (ref 136–145)
TRIGL SERPL-MCNC: 114 MG/DL (ref 0–150)
VLDLC SERPL-MCNC: 21 MG/DL (ref 5–40)
WBC # BLD AUTO: 6.31 10*3/MM3 (ref 3.4–10.8)

## 2021-05-13 PROCEDURE — 80048 BASIC METABOLIC PNL TOTAL CA: CPT | Performed by: NURSE PRACTITIONER

## 2021-05-13 PROCEDURE — 83036 HEMOGLOBIN GLYCOSYLATED A1C: CPT | Performed by: NURSE PRACTITIONER

## 2021-05-13 PROCEDURE — 25010000002 FENTANYL CITRATE (PF) 100 MCG/2ML SOLUTION: Performed by: INTERNAL MEDICINE

## 2021-05-13 PROCEDURE — 93458 L HRT ARTERY/VENTRICLE ANGIO: CPT | Performed by: INTERNAL MEDICINE

## 2021-05-13 PROCEDURE — 25010000002 HEPARIN (PORCINE) PER 1000 UNITS: Performed by: INTERNAL MEDICINE

## 2021-05-13 PROCEDURE — 82565 ASSAY OF CREATININE: CPT

## 2021-05-13 PROCEDURE — 85027 COMPLETE CBC AUTOMATED: CPT | Performed by: NURSE PRACTITIONER

## 2021-05-13 PROCEDURE — 25010000002 MIDAZOLAM PER 1 MG: Performed by: INTERNAL MEDICINE

## 2021-05-13 PROCEDURE — C1769 GUIDE WIRE: HCPCS | Performed by: INTERNAL MEDICINE

## 2021-05-13 PROCEDURE — 0 IOPAMIDOL PER 1 ML: Performed by: INTERNAL MEDICINE

## 2021-05-13 PROCEDURE — C1894 INTRO/SHEATH, NON-LASER: HCPCS | Performed by: INTERNAL MEDICINE

## 2021-05-13 PROCEDURE — 80061 LIPID PANEL: CPT | Performed by: NURSE PRACTITIONER

## 2021-05-13 RX ORDER — LIDOCAINE HYDROCHLORIDE 10 MG/ML
INJECTION, SOLUTION EPIDURAL; INFILTRATION; INTRACAUDAL; PERINEURAL AS NEEDED
Status: DISCONTINUED | OUTPATIENT
Start: 2021-05-13 | End: 2021-05-13 | Stop reason: HOSPADM

## 2021-05-13 RX ORDER — AMLODIPINE BESYLATE 5 MG/1
5 TABLET ORAL EVERY MORNING
COMMUNITY

## 2021-05-13 RX ORDER — HYDROCHLOROTHIAZIDE 12.5 MG/1
12.5 TABLET ORAL EVERY MORNING
COMMUNITY

## 2021-05-13 RX ORDER — SODIUM CHLORIDE 0.9 % (FLUSH) 0.9 %
3 SYRINGE (ML) INJECTION EVERY 12 HOURS SCHEDULED
Status: DISCONTINUED | OUTPATIENT
Start: 2021-05-13 | End: 2021-05-13 | Stop reason: HOSPADM

## 2021-05-13 RX ORDER — LEVOTHYROXINE SODIUM 0.1 MG/1
100 TABLET ORAL EVERY MORNING
COMMUNITY
End: 2021-12-30 | Stop reason: SDUPTHER

## 2021-05-13 RX ORDER — SODIUM CHLORIDE 9 MG/ML
1-3 INJECTION, SOLUTION INTRAVENOUS CONTINUOUS
Status: DISCONTINUED | OUTPATIENT
Start: 2021-05-13 | End: 2021-05-13 | Stop reason: HOSPADM

## 2021-05-13 RX ORDER — MIDAZOLAM HYDROCHLORIDE 1 MG/ML
INJECTION INTRAMUSCULAR; INTRAVENOUS AS NEEDED
Status: DISCONTINUED | OUTPATIENT
Start: 2021-05-13 | End: 2021-05-13 | Stop reason: HOSPADM

## 2021-05-13 RX ORDER — SODIUM CHLORIDE 0.9 % (FLUSH) 0.9 %
10 SYRINGE (ML) INJECTION AS NEEDED
Status: DISCONTINUED | OUTPATIENT
Start: 2021-05-13 | End: 2021-05-13 | Stop reason: HOSPADM

## 2021-05-13 RX ORDER — VALSARTAN 160 MG/1
160 TABLET ORAL EVERY MORNING
COMMUNITY
End: 2022-08-01 | Stop reason: SDUPTHER

## 2021-05-13 RX ORDER — ASPIRIN 81 MG/1
81 TABLET ORAL DAILY
Status: DISCONTINUED | OUTPATIENT
Start: 2021-05-14 | End: 2021-05-13 | Stop reason: HOSPADM

## 2021-05-13 RX ORDER — LEVOCETIRIZINE DIHYDROCHLORIDE 5 MG/1
5 TABLET, FILM COATED ORAL EVERY EVENING
COMMUNITY

## 2021-05-13 RX ORDER — ASPIRIN 81 MG/1
324 TABLET, CHEWABLE ORAL ONCE
Status: COMPLETED | OUTPATIENT
Start: 2021-05-13 | End: 2021-05-13

## 2021-05-13 RX ORDER — FLUOXETINE HYDROCHLORIDE 20 MG/1
20 CAPSULE ORAL NIGHTLY
COMMUNITY

## 2021-05-13 RX ORDER — ALLOPURINOL 100 MG/1
100 TABLET ORAL EVERY MORNING
COMMUNITY

## 2021-05-13 RX ORDER — FENTANYL CITRATE 50 UG/ML
INJECTION, SOLUTION INTRAMUSCULAR; INTRAVENOUS AS NEEDED
Status: DISCONTINUED | OUTPATIENT
Start: 2021-05-13 | End: 2021-05-13 | Stop reason: HOSPADM

## 2021-05-13 RX ORDER — ACETAMINOPHEN 325 MG/1
650 TABLET ORAL EVERY 4 HOURS PRN
Status: DISCONTINUED | OUTPATIENT
Start: 2021-05-13 | End: 2021-05-13 | Stop reason: HOSPADM

## 2021-05-13 RX ORDER — CYANOCOBALAMIN (VITAMIN B-12) 1000 MCG
1 TABLET ORAL EVERY MORNING
COMMUNITY

## 2021-05-13 RX ORDER — ERGOCALCIFEROL 1.25 MG/1
50000 CAPSULE ORAL WEEKLY
COMMUNITY

## 2021-05-13 RX ADMIN — SODIUM CHLORIDE 2.95 ML/KG/HR: 9 INJECTION, SOLUTION INTRAVENOUS at 08:59

## 2021-05-13 RX ADMIN — ASPIRIN 324 MG: 81 TABLET, CHEWABLE ORAL at 11:43

## 2021-05-13 RX ADMIN — ACETAMINOPHEN 650 MG: 325 TABLET ORAL at 13:42

## 2021-05-14 LAB — CREAT BLDA-MCNC: 1.2 MG/DL (ref 0.6–1.3)

## 2021-06-08 ENCOUNTER — HOSPITAL ENCOUNTER (OUTPATIENT)
Facility: HOSPITAL | Age: 61
Discharge: HOME | End: 2021-06-08
Payer: MEDICARE

## 2021-06-08 PROCEDURE — 36415 COLL VENOUS BLD VENIPUNCTURE: CPT

## 2021-09-02 ENCOUNTER — HOSPITAL ENCOUNTER (OUTPATIENT)
Facility: HOSPITAL | Age: 61
Discharge: HOME | End: 2021-09-02
Payer: MEDICARE

## 2021-09-02 PROCEDURE — 36415 COLL VENOUS BLD VENIPUNCTURE: CPT

## 2021-09-14 ENCOUNTER — HOSPITAL ENCOUNTER (OUTPATIENT)
Dept: ULTRASOUND IMAGING | Facility: HOSPITAL | Age: 61
Discharge: HOME | End: 2021-09-14
Payer: MEDICARE

## 2021-09-14 DIAGNOSIS — E03.9 HYPOTHYROIDISM, UNSPECIFIED TYPE: ICD-10-CM

## 2021-09-14 PROCEDURE — 76536 US EXAM OF HEAD AND NECK: CPT

## 2021-10-28 ENCOUNTER — HOSPITAL ENCOUNTER (OUTPATIENT)
Facility: HOSPITAL | Age: 61
Discharge: HOME | End: 2021-10-28
Payer: MEDICARE

## 2021-10-28 PROCEDURE — 36415 COLL VENOUS BLD VENIPUNCTURE: CPT

## 2021-11-24 RX ORDER — ROSUVASTATIN CALCIUM 10 MG/1
10 TABLET, COATED ORAL EVERY MORNING
Qty: 90 TABLET | Refills: 3 | Status: SHIPPED | OUTPATIENT
Start: 2021-11-24 | End: 2023-01-13

## 2021-12-29 ENCOUNTER — LAB (OUTPATIENT)
Dept: LAB | Facility: HOSPITAL | Age: 61
End: 2021-12-29

## 2021-12-29 ENCOUNTER — OFFICE VISIT (OUTPATIENT)
Dept: ENDOCRINOLOGY | Facility: CLINIC | Age: 61
End: 2021-12-29

## 2021-12-29 VITALS
SYSTOLIC BLOOD PRESSURE: 166 MMHG | BODY MASS INDEX: 43.06 KG/M2 | HEIGHT: 62 IN | HEART RATE: 74 BPM | DIASTOLIC BLOOD PRESSURE: 100 MMHG | WEIGHT: 234 LBS | OXYGEN SATURATION: 99 %

## 2021-12-29 DIAGNOSIS — E03.9 ACQUIRED HYPOTHYROIDISM: Primary | ICD-10-CM

## 2021-12-29 DIAGNOSIS — G47.00 INSOMNIA, UNSPECIFIED TYPE: ICD-10-CM

## 2021-12-29 PROBLEM — I10 HTN (HYPERTENSION): Status: ACTIVE | Noted: 2021-12-29

## 2021-12-29 PROBLEM — R73.03 PREDIABETES: Status: ACTIVE | Noted: 2021-12-29

## 2021-12-29 PROBLEM — M10.9 GOUT: Status: ACTIVE | Noted: 2021-12-29

## 2021-12-29 PROCEDURE — 99204 OFFICE O/P NEW MOD 45 MIN: CPT | Performed by: INTERNAL MEDICINE

## 2021-12-29 NOTE — PROGRESS NOTES
"Chief Complaint   Patient presents with   • Hypothyroidism     New referral        Referring Provider  Nahomy Guidry APRN HPI   Ramirez Gan is a 61 y.o. female had concerns including Hypothyroidism (New referral).   Here today for evaluation of hypothyroidism.  She is on levothyroxine 100 mcg daily.  TFTs have fluctuated significantly over the years.  Last levels from September showing slightly suppressed TSH.    Diagnosed in the 1980s. Initially diagnosed with hypo. Hasn't had hyperthyroidism other than due to dosing.     Takes the levothyroxine in the mornings, empty stomach, separate from other meds. Waits at least 30 minutes before meds/food/coffee.   Very rare missed doses - almost never. Has a pill box just for the levothyroxine. Good compliance in past years.    Is having eye itching and burning since the spring. Has some visual disturbance associated with this. Hasn't had an eye exam.     Is on generic levothyroxine and has been for years. Many years ago was on brand synthroid. Recalls max dose may have been 125 mcg and doses as low as 50s.     Her dose was increased from 88 mcg to 100 mcg around August.     Complains of fatigue, increased somnolence, napping increased.     Sleep at night is not good. Has trouble staying asleep.   Has tried melatonin but has daytime fatigue.     Weight has fluctuated up and down in the last few years. Reports not eating \"a whole lot\".     Past Medical History:   Diagnosis Date   • Acid reflux    • Anxiety    • Arthritis    • BBB (bundle branch block)     NEW L BBB DIAG. 3/2020   • Depression    • Fatigue    • Gout    • Gout    • Hyperlipidemia    • Hypertension    • Hypothyroidism    • Menopause    • Morbid obesity (HCC)    • Mumps    • HANS (obstructive sleep apnea)     UNABLE TO TOLERATE CPAP   • PONV (postoperative nausea and vomiting)    • SOB (shortness of breath)    • Vitamin D deficiency      Past Surgical History:   Procedure Laterality Date   • " APPENDECTOMY     • BACK SURGERY Bilateral     RODS AND SCREWS   • CARDIAC CATHETERIZATION N/A 5/13/2021    Procedure: Left Heart Cath-;  Surgeon: Jason Estrada MD;  Location: [facility] beds at the [facility] CATH INVASIVE LOC[facility];  Service: Cardiology;  Laterality: N/A;   • GALLBLADDER SURGERY     • HYSTERECTOMY     • KNEE SURGERY Bilateral       Family History   Problem Relation Age of Onset   • Heart attack Mother    • Heart failure Father    • Heart attack Brother       Social History     Socioeconomic History   • Marital status:    Tobacco Use   • Smoking status: Never Smoker   • Smokeless tobacco: Never Used   Vaping Use   • Vaping Use: Never used   Substance and Sexual Activity   • Alcohol use: Never   • Drug use: Never   • Sexual activity: Defer      No Known Allergies   Current Outpatient Medications on File Prior to Visit   Medication Sig Dispense Refill   • allopurinol (ZYLOPRIM) 100 MG tablet Take 100 mg by mouth Every Morning.     • amLODIPine (NORVASC) 5 MG tablet Take 5 mg by mouth Every Morning.     • celecoxib (CeleBREX) 200 MG capsule Take 200 mg by mouth 2 (Two) Times a Day.     • Cyanocobalamin (B-12) 1000 MCG tablet Take 1 tablet by mouth Every Morning.     • FLUoxetine (PROzac) 20 MG capsule Take 20 mg by mouth Every Night.     • hydroCHLOROthiazide (HYDRODIURIL) 12.5 MG tablet Take 12.5 mg by mouth Every Morning.     • hydrOXYzine (ATARAX) 25 MG tablet Take 25 mg by mouth Every 8 (Eight) Hours As Needed.     • levocetirizine (XYZAL) 5 MG tablet Take 5 mg by mouth Every Evening.     • levothyroxine (SYNTHROID, LEVOTHROID) 100 MCG tablet Take 100 mcg by mouth Every Morning.     • melatonin 5 MG tablet tablet Take 5-10 mg by mouth At Night As Needed.     • rosuvastatin (CRESTOR) 10 MG tablet Take 1 tablet by mouth Every Morning. 90 tablet 3   • tiZANidine (ZANAFLEX) 4 MG tablet Take 4 mg by mouth At Night As Needed for Muscle Spasms.     • valsartan (DIOVAN) 160 MG tablet Take 160 mg by mouth Every Morning.     •  "vitamin D (ERGOCALCIFEROL) 1.25 MG (26279 UT) capsule capsule Take 50,000 Units by mouth 1 (One) Time Per Week. EVERY SATURDAY       No current facility-administered medications on file prior to visit.        Review of Systems   Constitutional: Positive for appetite change, chills, diaphoresis and fatigue.   HENT: Positive for sinus pressure and sneezing.    Eyes: Positive for photophobia, redness, itching and visual disturbance.   Respiratory: Negative.    Cardiovascular: Negative.    Gastrointestinal: Positive for GERD.   Endocrine: Positive for cold intolerance and heat intolerance.        Hair loss   Genitourinary: Positive for urgency and urinary incontinence.   Musculoskeletal: Positive for arthralgias and myalgias.   Skin: Negative.    Allergic/Immunologic: Positive for environmental allergies.   Neurological: Positive for headache.   Hematological: Bruises/bleeds easily.   Psychiatric/Behavioral: Positive for sleep disturbance.        /100 (BP Location: Left arm, Patient Position: Sitting, Cuff Size: Adult)   Pulse 74   Ht 157.5 cm (62\")   Wt 106 kg (234 lb)   SpO2 99%   BMI 42.80 kg/m²      Physical Exam    Constitutional:  well developed; well nourished  no acute distress  obese - Body mass index is 42.8 kg/m².   ENT/Thyroid: no thyromegaly  no palpable nodules   Eyes: EOM intact  Conjunctiva: clear   Respiratory:  breathing is unlabored  clear to auscultation bilaterally   Cardiovascular:  regular rate and rhythm, S1, S2 normal, no murmur, click, rub or gallop   Chest:  Not performed.   Abdomen: Not performed.   : Not performed.   Musculoskeletal: negative findings:  ROM of all joints is normal, no deformities present   Skin: dry and warm   Neuro: normal without focal findings and mental status, speech normal, alert and oriented x3   Psych: oriented to time, place and person, mood and affect are within normal limits     Labs/Imaging  CMP  Lab Results   Component Value Date    GLUCOSE 96 " 05/13/2021    BUN 26 (H) 05/13/2021    CREATININE 1.20 05/13/2021    EGFRIFNONA 50 (L) 05/13/2021    BCR 23.2 05/13/2021    K 4.0 05/13/2021    CO2 24.0 05/13/2021    CALCIUM 9.1 05/13/2021        CBC w/DIFF   Lab Results   Component Value Date    WBC 6.31 05/13/2021    RBC 4.09 05/13/2021    HGB 11.8 (L) 05/13/2021    HCT 37.0 05/13/2021    MCV 90.5 05/13/2021    MCH 28.9 05/13/2021    MCHC 31.9 05/13/2021    RDW 13.3 05/13/2021    RDWSD 44.0 05/13/2021    MPV 10.9 05/13/2021     05/13/2021    NEUTRORELPCT 58.6 04/09/2021    LYMPHORELPCT 27.4 04/09/2021    MONORELPCT 5.9 04/09/2021    EOSRELPCT 6.8 04/09/2021    BASORELPCT 0.7 04/09/2021    AUTOIGPER 0.6 04/09/2021    NEUTROABS 3.2 04/09/2021    LYMPHSABS 1.5 04/09/2021    MONOSABS 0.3 04/09/2021    EOSABS 0.4 04/09/2021    BASOSABS 0.0 04/09/2021    AUTOIGNUM 0.0 04/09/2021       TSH  Lab Results   Component Value Date    TSH 51.25 (H) 04/09/2021    TSH 0.25 (L) 01/27/2020    TSH 0.45 05/27/2019     9/3/2021 TSH 0.097  6/8/2021 TSH 18    Thyroid ultrasound 9/14/2021 showed the right lobe measuring 2.1 x 1.1 x 0.9 cm, left lobe 3.0 x 0.8 x 0.7 cm, no nodules    Assessment and Plan    Diagnoses and all orders for this visit:    1. Acquired hypothyroidism (Primary)  Diagnosed in the 80s with hypothyroidism and TFTs have fluctuated significantly over recent years.  Currently on levothyroxine 100 mcg daily.  Clinically euthyroid though patient complaining of significant fatigue, also related to insomnia.  Suspect TFT fluctuation is due to weight fluctuation.   Patient compliant with medication.    Recheck TFTs today and titrate levothyroxine for TSH in the lower range of normal.  She may require alternating doses between 88 to 100 mcg as TSH was 18 on 88 mcg, slightly suppressed on 100 mcg.  If unable to normalize TFTs on generic levothyroxine, will consider transition to brand medication.  Thyroid is small on ultrasound from September.  Consistent with chronic  hypothyroidism.  No nodules.  No repeat ultrasound is necessary.  -     T4, Free  -     TSH    2. Insomnia, unspecified type  Primary insomnia, some improvement with melatonin.  Recommended nightly use of melatonin at a lower dose.  Management of thyroid as above.       Return in about 4 months (around 4/29/2022) for next scheduled follow up. The patient was instructed to contact the clinic with any interval questions or concerns.    Jessica Dewey, DO   Endocrinologist    Please note that portions of this note were completed with a voice recognition program.

## 2021-12-30 DIAGNOSIS — E03.9 ACQUIRED HYPOTHYROIDISM: Primary | ICD-10-CM

## 2021-12-30 LAB
T4 FREE SERPL-MCNC: 1.02 NG/DL (ref 0.93–1.7)
TSH SERPL DL<=0.005 MIU/L-ACNC: 35.9 UIU/ML (ref 0.27–4.2)

## 2021-12-30 RX ORDER — LEVOTHYROXINE SODIUM 112 UG/1
112 TABLET ORAL EVERY MORNING
Qty: 30 TABLET | Refills: 5 | Status: SHIPPED | OUTPATIENT
Start: 2021-12-30 | End: 2022-02-01

## 2022-02-01 DIAGNOSIS — E03.9 ACQUIRED HYPOTHYROIDISM: ICD-10-CM

## 2022-02-01 RX ORDER — LEVOTHYROXINE SODIUM 112 UG/1
112 TABLET ORAL EVERY MORNING
Qty: 90 TABLET | Refills: 0 | Status: SHIPPED | OUTPATIENT
Start: 2022-02-01 | End: 2022-02-15 | Stop reason: SDUPTHER

## 2022-02-08 ENCOUNTER — HOSPITAL ENCOUNTER (OUTPATIENT)
Facility: HOSPITAL | Age: 62
Discharge: HOME | End: 2022-02-08
Payer: MEDICARE

## 2022-02-08 LAB
A/G RATIO: 2 (ref 0.8–2)
ALBUMIN SERPL-MCNC: 4.1 G/DL (ref 3.4–4.8)
ALP BLD-CCNC: 139 U/L (ref 25–100)
ALT SERPL-CCNC: 16 U/L (ref 4–36)
ANION GAP SERPL CALCULATED.3IONS-SCNC: 12 MMOL/L (ref 3–16)
AST SERPL-CCNC: 18 U/L (ref 8–33)
BASOPHILS ABSOLUTE: 0 K/UL (ref 0–0.1)
BASOPHILS RELATIVE PERCENT: 0.4 %
BILIRUB SERPL-MCNC: 0.5 MG/DL (ref 0.3–1.2)
BUN BLDV-MCNC: 30 MG/DL (ref 6–20)
CALCIUM SERPL-MCNC: 9.4 MG/DL (ref 8.5–10.5)
CHLORIDE BLD-SCNC: 108 MMOL/L (ref 98–107)
CHOLESTEROL, TOTAL: 140 MG/DL (ref 0–200)
CO2: 21 MMOL/L (ref 20–30)
CREAT SERPL-MCNC: 1.3 MG/DL (ref 0.4–1.2)
EOSINOPHILS ABSOLUTE: 0.4 K/UL (ref 0–0.4)
EOSINOPHILS RELATIVE PERCENT: 8.4 %
FOLATE: 8.84 NG/ML
GFR AFRICAN AMERICAN: 50
GFR NON-AFRICAN AMERICAN: 42
GLOBULIN: 2.1 G/DL
GLUCOSE BLD-MCNC: 131 MG/DL (ref 74–106)
HBA1C MFR BLD: 5.7 %
HCT VFR BLD CALC: 39.8 % (ref 37–47)
HDLC SERPL-MCNC: 34 MG/DL (ref 40–60)
HEMOGLOBIN: 12.5 G/DL (ref 11.5–16.5)
IMMATURE GRANULOCYTES #: 0 K/UL
IMMATURE GRANULOCYTES %: 0.2 % (ref 0–5)
LDL CHOLESTEROL CALCULATED: 84 MG/DL
LYMPHOCYTES ABSOLUTE: 1.3 K/UL (ref 1.5–4)
LYMPHOCYTES RELATIVE PERCENT: 27.9 %
MCH RBC QN AUTO: 27.7 PG (ref 27–32)
MCHC RBC AUTO-ENTMCNC: 31.4 G/DL (ref 31–35)
MCV RBC AUTO: 88.2 FL (ref 80–100)
MONOCYTES ABSOLUTE: 0.3 K/UL (ref 0.2–0.8)
MONOCYTES RELATIVE PERCENT: 6.7 %
NEUTROPHILS ABSOLUTE: 2.7 K/UL (ref 2–7.5)
NEUTROPHILS RELATIVE PERCENT: 56.4 %
PDW BLD-RTO: 13.1 % (ref 11–16)
PLATELET # BLD: 218 K/UL (ref 150–400)
PMV BLD AUTO: 12 FL (ref 6–10)
POTASSIUM SERPL-SCNC: 4.3 MMOL/L (ref 3.4–5.1)
RBC # BLD: 4.51 M/UL (ref 3.8–5.8)
SODIUM BLD-SCNC: 141 MMOL/L (ref 136–145)
T4 FREE: 2.69 NG/DL (ref 0.89–1.76)
TOTAL PROTEIN: 6.2 G/DL (ref 6.4–8.3)
TRIGL SERPL-MCNC: 110 MG/DL (ref 0–249)
TSH SERPL DL<=0.05 MIU/L-ACNC: 0.05 UIU/ML (ref 0.27–4.2)
VITAMIN B-12: 1662 PG/ML (ref 211–911)
VITAMIN D 25-HYDROXY: 37.2 (ref 32–100)
VLDLC SERPL CALC-MCNC: 22 MG/DL
WBC # BLD: 4.8 K/UL (ref 4–11)

## 2022-02-08 PROCEDURE — 84439 ASSAY OF FREE THYROXINE: CPT

## 2022-02-08 PROCEDURE — 82306 VITAMIN D 25 HYDROXY: CPT

## 2022-02-08 PROCEDURE — 80061 LIPID PANEL: CPT

## 2022-02-08 PROCEDURE — 82607 VITAMIN B-12: CPT

## 2022-02-08 PROCEDURE — 80053 COMPREHEN METABOLIC PANEL: CPT

## 2022-02-08 PROCEDURE — 36415 COLL VENOUS BLD VENIPUNCTURE: CPT

## 2022-02-08 PROCEDURE — 83036 HEMOGLOBIN GLYCOSYLATED A1C: CPT

## 2022-02-08 PROCEDURE — 84443 ASSAY THYROID STIM HORMONE: CPT

## 2022-02-08 PROCEDURE — 82746 ASSAY OF FOLIC ACID SERUM: CPT

## 2022-02-08 PROCEDURE — 85025 COMPLETE CBC W/AUTO DIFF WBC: CPT

## 2022-02-08 PROCEDURE — 84481 FREE ASSAY (FT-3): CPT

## 2022-02-09 LAB — T3 FREE: 3.6 PG/ML (ref 2.3–4.2)

## 2022-02-14 ENCOUNTER — TELEPHONE (OUTPATIENT)
Dept: ENDOCRINOLOGY | Facility: CLINIC | Age: 62
End: 2022-02-14

## 2022-02-15 DIAGNOSIS — E03.9 ACQUIRED HYPOTHYROIDISM: ICD-10-CM

## 2022-02-15 RX ORDER — LEVOTHYROXINE SODIUM 112 UG/1
TABLET ORAL
Qty: 40 TABLET | Refills: 1 | Status: SHIPPED | OUTPATIENT
Start: 2022-02-15 | End: 2022-03-30

## 2022-02-15 RX ORDER — LEVOTHYROXINE SODIUM 0.1 MG/1
TABLET ORAL
Qty: 50 TABLET | Refills: 1 | Status: SHIPPED | OUTPATIENT
Start: 2022-02-15 | End: 2022-03-30 | Stop reason: SDUPTHER

## 2022-03-29 ENCOUNTER — HOSPITAL ENCOUNTER (OUTPATIENT)
Facility: HOSPITAL | Age: 62
Discharge: HOME | End: 2022-03-29
Payer: MEDICARE

## 2022-03-29 LAB
A/G RATIO: 1.9 (ref 0.8–2)
ALBUMIN SERPL-MCNC: 4 G/DL (ref 3.4–4.8)
ALP BLD-CCNC: 142 U/L (ref 25–100)
ALT SERPL-CCNC: 26 U/L (ref 4–36)
ANION GAP SERPL CALCULATED.3IONS-SCNC: 13 MMOL/L (ref 3–16)
AST SERPL-CCNC: 27 U/L (ref 8–33)
BILIRUB SERPL-MCNC: 0.7 MG/DL (ref 0.3–1.2)
BUN BLDV-MCNC: 27 MG/DL (ref 6–20)
CALCIUM SERPL-MCNC: 9.2 MG/DL (ref 8.5–10.5)
CHLORIDE BLD-SCNC: 108 MMOL/L (ref 98–107)
CO2: 21 MMOL/L (ref 20–30)
CREAT SERPL-MCNC: 1 MG/DL (ref 0.4–1.2)
GFR AFRICAN AMERICAN: >59
GFR NON-AFRICAN AMERICAN: 56
GLOBULIN: 2.1 G/DL
GLUCOSE BLD-MCNC: 150 MG/DL (ref 74–106)
POTASSIUM SERPL-SCNC: 4 MMOL/L (ref 3.4–5.1)
SODIUM BLD-SCNC: 142 MMOL/L (ref 136–145)
T4 FREE: 2.38 NG/DL (ref 0.89–1.76)
TOTAL PROTEIN: 6.1 G/DL (ref 6.4–8.3)
TSH SERPL DL<=0.05 MIU/L-ACNC: <0.01 UIU/ML (ref 0.27–4.2)
VITAMIN B-12: 1224 PG/ML (ref 211–911)

## 2022-03-29 PROCEDURE — 36415 COLL VENOUS BLD VENIPUNCTURE: CPT

## 2022-03-29 PROCEDURE — 84443 ASSAY THYROID STIM HORMONE: CPT

## 2022-03-29 PROCEDURE — 80053 COMPREHEN METABOLIC PANEL: CPT

## 2022-03-29 PROCEDURE — 82607 VITAMIN B-12: CPT

## 2022-03-29 PROCEDURE — 84439 ASSAY OF FREE THYROXINE: CPT

## 2022-03-30 ENCOUNTER — PATIENT MESSAGE (OUTPATIENT)
Dept: ENDOCRINOLOGY | Facility: CLINIC | Age: 62
End: 2022-03-30

## 2022-03-30 DIAGNOSIS — E03.9 ACQUIRED HYPOTHYROIDISM: ICD-10-CM

## 2022-03-30 RX ORDER — LEVOTHYROXINE SODIUM 0.1 MG/1
100 TABLET ORAL DAILY
Qty: 30 TABLET | Refills: 5 | Status: SHIPPED | OUTPATIENT
Start: 2022-03-30 | End: 2022-05-25

## 2022-04-06 ENCOUNTER — TELEPHONE (OUTPATIENT)
Dept: ENDOCRINOLOGY | Facility: CLINIC | Age: 62
End: 2022-04-06

## 2022-05-23 ENCOUNTER — LAB (OUTPATIENT)
Dept: LAB | Facility: HOSPITAL | Age: 62
End: 2022-05-23

## 2022-05-23 PROCEDURE — 84439 ASSAY OF FREE THYROXINE: CPT | Performed by: INTERNAL MEDICINE

## 2022-05-23 PROCEDURE — 84443 ASSAY THYROID STIM HORMONE: CPT | Performed by: INTERNAL MEDICINE

## 2022-05-25 ENCOUNTER — OFFICE VISIT (OUTPATIENT)
Dept: ENDOCRINOLOGY | Facility: CLINIC | Age: 62
End: 2022-05-25

## 2022-05-25 VITALS
HEIGHT: 62 IN | DIASTOLIC BLOOD PRESSURE: 76 MMHG | WEIGHT: 229 LBS | OXYGEN SATURATION: 98 % | SYSTOLIC BLOOD PRESSURE: 128 MMHG | HEART RATE: 80 BPM | BODY MASS INDEX: 42.14 KG/M2

## 2022-05-25 DIAGNOSIS — E03.9 ACQUIRED HYPOTHYROIDISM: Primary | ICD-10-CM

## 2022-05-25 PROCEDURE — 99213 OFFICE O/P EST LOW 20 MIN: CPT | Performed by: INTERNAL MEDICINE

## 2022-05-25 RX ORDER — LEVOTHYROXINE SODIUM 88 UG/1
88 CAPSULE ORAL DAILY
Qty: 30 CAPSULE | Refills: 5 | Status: SHIPPED | OUTPATIENT
Start: 2022-05-25 | End: 2022-07-12

## 2022-05-25 NOTE — PROGRESS NOTES
"Chief Complaint   Patient presents with   • Hypothyroidism        HPI   Ramirez Gan is a 62 y.o. female had concerns including Hypothyroidism.      Is on levothyroxine 100 mcg daily.   TSH was elevated on 100 mcg last December at 35.9.  Most recent TSH still suppressed at 0.11, free T4 is normal.  Patient attests excellent compliance with the medication.  She takes first thing in the morning, at least an hour before other meds and foods, takes with water, no missed doses, uses a pill box to ensure no missed doses.    Insomnia is better.  Still has fatigue.  Notes both heat and cold intolerance.    The following portions of the patient's history were reviewed and updated as appropriate: allergies, current medications, past family history, past medical history, past social history, past surgical history and problem list.    Review of Systems   Constitutional: Positive for fatigue.   Endocrine: Positive for cold intolerance and heat intolerance.        /76   Pulse 80   Ht 157.5 cm (62\")   Wt 104 kg (229 lb)   SpO2 98%   BMI 41.88 kg/m²      Physical Exam  Vitals reviewed.   Constitutional:       Appearance: Normal appearance. She is obese.      Comments: Body mass index is 41.88 kg/m².   Cardiovascular:      Rate and Rhythm: Normal rate.   Pulmonary:      Effort: Pulmonary effort is normal.   Neurological:      General: No focal deficit present.      Mental Status: She is alert. Mental status is at baseline.   Psychiatric:         Mood and Affect: Mood normal.         Behavior: Behavior normal.          CMP  Lab Results   Component Value Date    GLUCOSE 96 05/13/2021    BUN 26 (H) 05/13/2021    CREATININE 1.20 05/13/2021    EGFRIFNONA 50 (L) 05/13/2021    BCR 23.2 05/13/2021    K 4.0 05/13/2021    CO2 24.0 05/13/2021    CALCIUM 9.1 05/13/2021        CBC w/DIFF   Lab Results   Component Value Date    WBC 4.8 02/08/2022    RBC 4.51 02/08/2022    HGB 12.5 02/08/2022    HCT 39.8 02/08/2022    MCV 88.2 " 02/08/2022    MCH 27.7 02/08/2022    MCHC 31.4 02/08/2022    RDW 13.1 02/08/2022    RDWSD 44.0 05/13/2021    MPV 12.0 (H) 02/08/2022     02/08/2022    NEUTRORELPCT 56.4 02/08/2022    LYMPHORELPCT 27.9 02/08/2022    MONORELPCT 6.7 02/08/2022    EOSRELPCT 8.4 02/08/2022    BASORELPCT 0.4 02/08/2022    AUTOIGPER 0.2 02/08/2022    NEUTROABS 2.7 02/08/2022    LYMPHSABS 1.3 (L) 02/08/2022    MONOSABS 0.3 02/08/2022    EOSABS 0.4 02/08/2022    BASOSABS 0.0 02/08/2022    AUTOIGNUM 0.0 02/08/2022       TSH  Lab Results   Component Value Date    TSH 0.110 (L) 05/23/2022    TSH <0.01 (L) 03/29/2022    TSH 0.05 (L) 02/08/2022    TSH 35.900 (H) 12/29/2021    TSH 51.25 (H) 04/09/2021    TSH 0.25 (L) 01/27/2020     T4  Lab Results   Component Value Date    FREET4 1.58 05/23/2022    FREET4 2.38 (H) 03/29/2022    FREET4 2.69 (H) 02/08/2022    FREET4 1.02 12/29/2021     T3  Lab Results   Component Value Date    T3FREE 3.6 02/08/2022       Assessment and Plan    Diagnoses and all orders for this visit:    1. Acquired hypothyroidism (Primary)  Uncontrolled with TSH suppressed on 100 mcg levothyroxine daily.  TSH was elevated to 35.9 on the same dose in December 2021.  She has a longstanding history of fluctuating thyroid levels and doses despite excellent compliance with medication.  No GI issues, no suspicion for celiac disease.  Suspect she has variable absorption to generic levothyroxine and we will transition her to brand Tirosint.  Samples given today.  We will send prescription for Tirosint 88 mcg daily.  Recheck TFTs in 6 weeks.  -     TSH; Future  -     T4, Free; Future         Return in about 6 months (around 11/25/2022) for next scheduled follow up. The patient was instructed to contact the clinic with any interval questions or concerns.    Jessica Dewey,    Endocrinologist    Please note that portions of this note were completed with a voice recognition program.

## 2022-07-12 DIAGNOSIS — E03.9 ACQUIRED HYPOTHYROIDISM: Primary | ICD-10-CM

## 2022-07-12 RX ORDER — LEVOTHYROXINE SODIUM 75 UG/1
75 CAPSULE ORAL DAILY
Qty: 30 CAPSULE | Refills: 5 | Status: SHIPPED | OUTPATIENT
Start: 2022-07-12 | End: 2022-08-22

## 2022-08-01 ENCOUNTER — OFFICE VISIT (OUTPATIENT)
Dept: CARDIOLOGY | Facility: CLINIC | Age: 62
End: 2022-08-01

## 2022-08-01 VITALS
BODY MASS INDEX: 42.88 KG/M2 | SYSTOLIC BLOOD PRESSURE: 108 MMHG | WEIGHT: 233 LBS | OXYGEN SATURATION: 98 % | DIASTOLIC BLOOD PRESSURE: 64 MMHG | HEART RATE: 75 BPM | HEIGHT: 62 IN

## 2022-08-01 DIAGNOSIS — E78.2 MIXED HYPERLIPIDEMIA: ICD-10-CM

## 2022-08-01 DIAGNOSIS — I10 ESSENTIAL HYPERTENSION: Primary | ICD-10-CM

## 2022-08-01 DIAGNOSIS — G47.9 SLEEP DISTURBANCE: ICD-10-CM

## 2022-08-01 PROCEDURE — 99214 OFFICE O/P EST MOD 30 MIN: CPT | Performed by: INTERNAL MEDICINE

## 2022-08-01 RX ORDER — LORATADINE 10 MG/1
10 TABLET ORAL DAILY
COMMUNITY
Start: 2022-07-06

## 2022-08-01 RX ORDER — FLUCONAZOLE 150 MG/1
150 TABLET ORAL DAILY
COMMUNITY
Start: 2022-07-20

## 2022-08-01 RX ORDER — LORAZEPAM 2 MG/1
TABLET ORAL
COMMUNITY
Start: 2022-06-01

## 2022-08-01 RX ORDER — VALSARTAN 40 MG/1
40 TABLET ORAL EVERY MORNING
Qty: 30 TABLET | Refills: 11 | Status: SHIPPED | OUTPATIENT
Start: 2022-08-01 | End: 2023-04-06

## 2022-08-01 NOTE — PROGRESS NOTES
[facility] Cardiology   [address], [address]  [place], [place], [address] [phone]  [email or web address]           OUTPATIENT CLINIC FOLLOW UP NOTE    Patient Care Team:  Patient Care Team:  Nahomy Guidry APRN as PCP - General (Nurse Practitioner)  Jessica Dewey DO as Consulting Physician (Endocrinology)    Subjective:      Chief Complaint   Patient presents with   • Hypertension   • Hyperlipidemia   • LBBB       HPI:    Ramirez Gan is a 62 y.o. female.  Problem list:  1. Worsening fatigue, 3/2020  2. Palpitations  1. Heart monitor without significant arrhythmia 7/2022  3. Newly diagnosed left bundle branch block 3/2020  4. Mixed hyperlipidemia,  3/2020 lightheadedness with pravastatin  5. History of sleep apnea, did not tolerate CPAP, possible  6. Obesity, status post prior gastric surgery    She presents today for follow-up.    Since patient was last seen she has had a couple episodes of significant lightheadedness.  Presyncopal like symptoms.  Vision changes.  Symptoms come and go.  No clear exacerbating factors.  Blood pressure has been low.  At times in the 90s systolic.  Denies chest pain.  Short brief flutters of the chest.    Review of Systems:  As noted in the HPI     PFSH:  Patient Active Problem List   Diagnosis   • Mixed hyperlipidemia   • LBBB (left bundle branch block)   • Anginal equivalent (HCC)   • Acquired hypothyroidism   • Insomnia   • HTN (hypertension)   • Gout   • Prediabetes         Current Outpatient Medications:   •  allopurinol (ZYLOPRIM) 100 MG tablet, Take 100 mg by mouth Every Morning., Disp: , Rfl:   •  amLODIPine (NORVASC) 5 MG tablet, Take 5 mg by mouth Every Morning., Disp: , Rfl:   •  celecoxib (CeleBREX) 200 MG capsule, Take 200 mg by mouth 2 (Two) Times a Day., Disp: , Rfl:   •  Cyanocobalamin (B-12) 1000 MCG tablet, Take 1 tablet by mouth Every Morning., Disp: , Rfl:   •  FLUoxetine (PROzac) 20 MG capsule, Take 20 mg  "by mouth Every Night., Disp: , Rfl:   •  hydroCHLOROthiazide (HYDRODIURIL) 12.5 MG tablet, Take 12.5 mg by mouth Every Morning., Disp: , Rfl:   •  levocetirizine (XYZAL) 5 MG tablet, Take 5 mg by mouth Every Evening., Disp: , Rfl:   •  levothyroxine sodium (Tirosint) 75 MCG capsule, Take 1 capsule by mouth Daily., Disp: 30 capsule, Rfl: 5  •  melatonin 5 MG tablet tablet, Take 5-10 mg by mouth At Night As Needed., Disp: , Rfl:   •  rosuvastatin (CRESTOR) 10 MG tablet, Take 1 tablet by mouth Every Morning., Disp: 90 tablet, Rfl: 3  •  tiZANidine (ZANAFLEX) 4 MG tablet, Take 4 mg by mouth At Night As Needed for Muscle Spasms., Disp: , Rfl:   •  valsartan (DIOVAN) 40 MG tablet, Take 1 tablet by mouth Every Morning., Disp: 30 tablet, Rfl: 11  •  vitamin D (ERGOCALCIFEROL) 1.25 MG (47686 UT) capsule capsule, Take 50,000 Units by mouth 1 (One) Time Per Week. EVERY SATURDAY, Disp: , Rfl:   •  Allergy Relief 10 MG tablet, Take 10 mg by mouth Daily., Disp: , Rfl:   •  fluconazole (DIFLUCAN) 150 MG tablet, Take 150 mg by mouth Daily., Disp: , Rfl:   •  LORazepam (ATIVAN) 2 MG tablet, , Disp: , Rfl:     Not on File     reports that she has never smoked. She has never used smokeless tobacco.    Family History   Problem Relation Age of Onset   • Heart attack Mother    • Heart failure Father    • Heart attack Brother          Objective:   Physical exam:  /64 (BP Location: Right arm, Patient Position: Sitting)   Pulse 75   Ht 157.5 cm (62.01\")   Wt 106 kg (233 lb)   SpO2 98%   BMI 42.61 kg/m²   CONSTITUTIONAL: No acute distress  RESPIRATORY: Normal effort. Clear to auscultation bilaterally without wheezing or rales  CARDIOVASCULAR: Carotids with normal upstrokes without bruits.  Regular rate and rhythm with normal S1 and S2. Without murmur, gallop or rub. Normal radial pulse.     Labs:      Lab Results   Component Value Date    CHOL 148 05/13/2021     Lab Results   Component Value Date    TRIG 110 02/08/2022    TRIG 114 " 05/13/2021    TRIG 122 04/09/2021     Lab Results   Component Value Date    HDL 34 (L) 02/08/2022    HDL 44 05/13/2021    HDL 48 04/09/2021     Lab Results   Component Value Date    LDL 84 02/08/2022     No components found for: LDLDIRECTC    Diagnostic Data:    Procedures    Nuclear stress testing 3/2020 Tim Rothman  -Left ventricular ejection fraction of 72%.  -There is normal isotope uptake at stress and rest.  There is no evidence of myocardial ischemia or scar.  -There is a fixed anteroseptal defect consistent with breast attenuation artifact.  -Impressions are consistent with a low risk stress test.    [facility] 5/2021  · There were no hemodynamically significant, flow-limiting stenoses.  There was mild atherosclerosis.  · Normal left ventricular ejection fraction 65%    Heart monitor 7/2022, OSH: 7 days, bundle branch, sinus rhythm, 2 short AT runs lasting no longer than 6 beats, NSVT x2 runs, longest only 4 beats, rare ectopy    Assessment and Plan:       Presyncope, lightheadedness  Palpitations  LBBB (left bundle branch block)  Essential hypertension  Thyroid dysfunction  -Unclear etiology of symptoms.  May be due to varying blood pressure, predominantly hypotensive these days.  Varying dose of thyroid medication lately  -Decrease valsartan from 80 mg to 40 mg daily  -Patient to keep track of blood pressure for the next week.  We will call her at that point to check on how she is feeling and how her blood pressure is doing  -If systolic blood pressure continues to average 110 mmHg or lower, discontinue valsartan  -Continue hydrochlorothiazide and amlodipine for now  -If without notable improvement despite better control of her blood pressure, recommend carotid duplex ultrasound and echocardiogram for presyncope  -Also can consider a longer-term monitor, since she did not have any significant episodes while wearing her heart monitor in 7/2022    Mixed hyperlipidemia  -Patient has been tolerating Crestor.   Repeat lipid panel and LFTs.  Will consider increase pending results.  External form given today    Sleep apnea  Sleep disturbances  -New sleep referral appointment    Obesity  -Encouraged exercise    - Return in about 6 months (around 2/1/2023) for Next scheduled follow up, or sooner if needed.      Jason Estrada MD, MSc, FACC, [facility]  Interventional Cardiology  [facility]

## 2022-08-09 ENCOUNTER — TELEPHONE (OUTPATIENT)
Dept: CARDIOLOGY | Facility: CLINIC | Age: 62
End: 2022-08-09

## 2022-08-09 NOTE — TELEPHONE ENCOUNTER
----- Message from Jessica Pichardo RN sent at 8/1/2022  1:59 PM EDT -----  Please call this patient in 1 week and check on her presyncopal like symptoms/lightheadedness and her blood pressure.  If blood pressure averaging above 110 mmHg systolic, no additional changes.  If still low, recommend echocardiogram and carotid duplex ultrasound at [facility] and discontinuing valsartan

## 2022-08-19 ENCOUNTER — PATIENT MESSAGE (OUTPATIENT)
Dept: ENDOCRINOLOGY | Facility: CLINIC | Age: 62
End: 2022-08-19

## 2022-08-19 ENCOUNTER — HOSPITAL ENCOUNTER (OUTPATIENT)
Facility: HOSPITAL | Age: 62
Discharge: HOME | End: 2022-08-19
Payer: MEDICARE

## 2022-08-19 DIAGNOSIS — E03.9 ACQUIRED HYPOTHYROIDISM: ICD-10-CM

## 2022-08-19 LAB
T4 FREE: 0.94 NG/DL (ref 0.89–1.76)
TSH SERPL DL<=0.05 MIU/L-ACNC: 16.35 UIU/ML (ref 0.27–4.2)

## 2022-08-19 PROCEDURE — 84443 ASSAY THYROID STIM HORMONE: CPT

## 2022-08-19 PROCEDURE — 84439 ASSAY OF FREE THYROXINE: CPT

## 2022-08-22 ENCOUNTER — OFFICE VISIT (OUTPATIENT)
Dept: SLEEP MEDICINE | Facility: CLINIC | Age: 62
End: 2022-08-22

## 2022-08-22 VITALS
HEART RATE: 80 BPM | OXYGEN SATURATION: 94 % | WEIGHT: 226 LBS | HEIGHT: 62 IN | SYSTOLIC BLOOD PRESSURE: 152 MMHG | DIASTOLIC BLOOD PRESSURE: 89 MMHG | BODY MASS INDEX: 41.59 KG/M2

## 2022-08-22 DIAGNOSIS — E66.01 CLASS 3 SEVERE OBESITY WITH BODY MASS INDEX (BMI) OF 40.0 TO 44.9 IN ADULT, UNSPECIFIED OBESITY TYPE, UNSPECIFIED WHETHER SERIOUS COMORBIDITY PRESENT: ICD-10-CM

## 2022-08-22 DIAGNOSIS — E03.9 ACQUIRED HYPOTHYROIDISM: ICD-10-CM

## 2022-08-22 DIAGNOSIS — G47.19 EXCESSIVE DAYTIME SLEEPINESS: ICD-10-CM

## 2022-08-22 DIAGNOSIS — R29.818 SUSPECTED SLEEP APNEA: ICD-10-CM

## 2022-08-22 DIAGNOSIS — R06.83 SNORING: Primary | ICD-10-CM

## 2022-08-22 PROCEDURE — 99203 OFFICE O/P NEW LOW 30 MIN: CPT | Performed by: NURSE PRACTITIONER

## 2022-08-22 RX ORDER — LEVOTHYROXINE SODIUM 0.07 MG/1
75 TABLET ORAL EVERY OTHER DAY
Qty: 15 TABLET | Refills: 5 | Status: SHIPPED | OUTPATIENT
Start: 2022-08-22

## 2022-08-22 RX ORDER — LEVOTHYROXINE SODIUM 0.07 MG/1
75 TABLET ORAL DAILY
Qty: 30 TABLET | Refills: 5 | Status: SHIPPED | OUTPATIENT
Start: 2022-08-22 | End: 2022-08-22 | Stop reason: SDUPTHER

## 2022-08-22 RX ORDER — IBUPROFEN 800 MG/1
TABLET ORAL
COMMUNITY
Start: 2022-08-09

## 2022-08-22 RX ORDER — LEVOTHYROXINE SODIUM 88 UG/1
88 TABLET ORAL EVERY OTHER DAY
Qty: 15 TABLET | Refills: 5 | Status: SHIPPED | OUTPATIENT
Start: 2022-08-22 | End: 2023-08-22

## 2022-08-22 NOTE — PROGRESS NOTES
Chief Complaint  Sleeping Problem    Subjective     History of Present Illness:  Ramirez Gan is a 62 y.o. female who presents for HANS.  She has a history of hypertension, LBBB, and HL.  Patient has had worsening fatigue, palpitations, and thyroid dysfunction.  She states that she has been passing out, and lightheadedness. Her BP medication was changed and that did help. She had been on PAP therapy for obstructive sleep apnea but apparently was intolerant of CPAP.  She reports snoring, daytime sleepiness fatigue, frequent awakening, morning headaches, nonrestorative sleep, heartburn, dry mouth, sore throat, nasal allergies, grinding teeth, leg/body jerks, leg cramps, frequent nighttime urination, difficulty falling asleep, difficulty staying asleep, pain at night, sleep talking, nighttime sweats, abnormal heart rhythm.  Patient has had a sleep consultation approximately 15 years ago. She states she did use pap therapy but could not use the mask. She typically goes to bed at 10 PM and wakes at 5 AM on weekends and weekdays.  She gets approximately 4-5 hours of sleep.  It takes her 2-3 hours to initially get to sleep.  She does take naps approximately an hour.     Further details are as follows:    Verona Scale is: 14/24    Estimated average amount of sleep per night: 5  Number of times she wakes up at night: 4  Difficulty falling back asleep: yes  It usually takes about 120 minutes to go to sleep.  She feels sleepy upon waking up: yes  Rotating or night shift work: yes    Drowsiness/Sleepiness:  She exhibits the following:  excessive daytime sleepiness  excessive daytime fatigue  falls asleep watching TV  takes scheduled naps during the day    Snoring/Breathing:  She exhibits the following:  loud snoring, snores in all sleep positions, quits breathing at night, awakens with dry mouth, sore throat when waking up in the morning, trouble breathing through nose at night, trouble breathing through nose during the  day and morning headaches    Head Injury:  She exhibits the following:  No    Reflux:  She describes the following:  wakes up at night with a sour taste or burning sensation in chest    Narcolepsy:  She exhibits the following:  none    RLS/PLMs:  She describes the following:  moves or jerks during sleep  Leg cramps    Insomnia:  She describes the following:  problems initiating sleep at night  frequent awakenings  restless sleep    Parasomnia:  She exhibits the following:  sleep talks  excessive sweating while sleeping  grinds teeth    Weight:  Weight change in the last year:  gain: 35 lbs    The patient's relevant past medical, surgical, family, and social history reviewed and updated in Epic as appropriate.    Review of Systems   HENT: Positive for congestion, sinus pressure, sneezing and sore throat.    Eyes: Positive for discharge and itching.   Cardiovascular: Positive for palpitations.   Endocrine: Positive for cold intolerance and heat intolerance.   Musculoskeletal: Positive for back pain.   Allergic/Immunologic: Positive for environmental allergies and food allergies.   Neurological: Positive for dizziness, light-headedness and headache.   Hematological: Bruises/bleeds easily.   Psychiatric/Behavioral: The patient is nervous/anxious.        PMH:    Past Medical History:   Diagnosis Date   • Acid reflux    • Anxiety    • Arthritis    • BBB (bundle branch block)     NEW L BBB DIAG. 3/2020   • Depression    • Fatigue    • Gout    • Gout    • Hyperlipidemia    • Hypertension    • Hypothyroidism    • Menopause    • Morbid obesity (HCC)    • Mumps    • HANS (obstructive sleep apnea)     UNABLE TO TOLERATE CPAP   • PONV (postoperative nausea and vomiting)    • SOB (shortness of breath)    • Vitamin D deficiency      Past Surgical History:   Procedure Laterality Date   • APPENDECTOMY     • BACK SURGERY Bilateral     RODS AND SCREWS   • CARDIAC CATHETERIZATION N/A 5/13/2021    Procedure: Left Heart Cath-;  Surgeon:  Jason Estrada MD;  Location: [facility] INVASIVE LOCATION;  Service: Cardiology;  Laterality: N/A;   • GALLBLADDER SURGERY     • HYSTERECTOMY     • KNEE SURGERY Bilateral      OB History    No obstetric history on file.       No Known Allergies    MEDS:  Prior to Admission medications    Medication Sig Start Date End Date Taking? Authorizing Provider   Allergy Relief 10 MG tablet Take 10 mg by mouth Daily. 7/6/22   Shailesh Gutierrez MD   allopurinol (ZYLOPRIM) 100 MG tablet Take 100 mg by mouth Every Morning.    Shailesh Gutierrez MD   amLODIPine (NORVASC) 5 MG tablet Take 5 mg by mouth Every Morning.    Shailesh Gutierrez MD   celecoxib (CeleBREX) 200 MG capsule Take 200 mg by mouth 2 (Two) Times a Day.    Shailesh Gutierrez MD   Cyanocobalamin (B-12) 1000 MCG tablet Take 1 tablet by mouth Every Morning.    Shailesh Gutierrez MD   fluconazole (DIFLUCAN) 150 MG tablet Take 150 mg by mouth Daily. 7/20/22   Shailesh Gutierrez MD   FLUoxetine (PROzac) 20 MG capsule Take 20 mg by mouth Every Night.    Shailesh Gutierrez MD   hydroCHLOROthiazide (HYDRODIURIL) 12.5 MG tablet Take 12.5 mg by mouth Every Morning.    Shailesh Gutierrez MD   levocetirizine (XYZAL) 5 MG tablet Take 5 mg by mouth Every Evening.    Shailesh Gutierrez MD   levothyroxine (Synthroid) 75 MCG tablet Take 1 tablet by mouth Daily. On empty stomach 8/22/22   Jessica Dewey, DO   LORazepam (ATIVAN) 2 MG tablet  6/1/22   Shailesh Gutierrez MD   melatonin 5 MG tablet tablet Take 5-10 mg by mouth At Night As Needed.    Shailesh Gutierrez MD   rosuvastatin (CRESTOR) 10 MG tablet Take 1 tablet by mouth Every Morning. 11/24/21   Jason Estrada MD   tiZANidine (ZANAFLEX) 4 MG tablet Take 4 mg by mouth At Night As Needed for Muscle Spasms.    Shailesh Gutierrez MD   valsartan (DIOVAN) 40 MG tablet Take 1 tablet by mouth Every Morning. 8/1/22   Jason Estrada MD   vitamin D (ERGOCALCIFEROL) 1.25 MG (88332 UT) capsule  "capsule Take 50,000 Units by mouth 1 (One) Time Per Week. EVERY SATURDAY    Provider, MD Shailesh   levothyroxine sodium (Tirosint) 75 MCG capsule Take 1 capsule by mouth Daily. 7/12/22 8/22/22  Jessica Dewey DO       FH:  Family History   Problem Relation Age of Onset   • Heart attack Mother    • Heart failure Father    • Heart attack Brother        Objective   Vital Signs:  /89 (BP Location: Left arm, Patient Position: Sitting, Cuff Size: Adult)   Pulse 80   Ht 157.5 cm (62\")   Wt 103 kg (226 lb)   SpO2 94%   BMI 41.34 kg/m²     Class 3 Severe Obesity (BMI >=40). Obesity-related health conditions include the following: obstructive sleep apnea, hypertension, coronary heart disease and diabetes mellitus. Obesity is unchanged. BMI is is above average; BMI management plan is completed. We discussed portion control and increasing exercise.        Physical Exam  Vitals reviewed.   Constitutional:       Appearance: Normal appearance.   HENT:      Head: Normocephalic and atraumatic.      Nose: Nose normal.      Mouth/Throat:      Mouth: Mucous membranes are moist.   Cardiovascular:      Rate and Rhythm: Normal rate and regular rhythm.      Heart sounds: No murmur heard.    No friction rub. No gallop.   Pulmonary:      Effort: Pulmonary effort is normal. No respiratory distress.      Breath sounds: Normal breath sounds. No wheezing or rhonchi.   Neurological:      Mental Status: She is alert and oriented to person, place, and time.   Psychiatric:         Behavior: Behavior normal.         Mallampati Score: III (soft and hard palate and base of uvula visible)    Result Review :               Assessment and Plan  This is a 62-year-old female who is referred by Dr. Estrada for worsening fatigue, and palpitations.  The patient has a history of new onset left bundle branch block, hypertension, and hyperlipidemia. She had been diagnosed with HANS and started PAP therapy but was intolerant of the mask.  Her " symptomology is certainly concerning for ongoing obstructive sleep apnea.  It has been 15 years since she had a sleep test.  We discussed different types of mask and new machines since her last attempt at PAP therapy.  We will schedule a home sleep test for further evaluation after which she will return for follow-up and recommendations.  We may need to consider referral to psychology for CPAP mask intolerance training.  Patient is aware of her weight and has been working on her diet.    Diagnoses and all orders for this visit:    1. Snoring (Primary)  -     Home Sleep Study; Future    2. Suspected sleep apnea  -     Home Sleep Study; Future    3. Excessive daytime sleepiness  -     Home Sleep Study; Future    4. Class 3 severe obesity with body mass index (BMI) of 40.0 to 44.9 in adult, unspecified obesity type, unspecified whether serious comorbidity present (HCC)               I discussed the consequences of uncontrolled sleep apnea including hypertension, heart disease, diabetes, stroke, and dementia. I further discussed sleep apnea therapeutic options including CPAP, Weight loss, Oral dental appliance, and surgery.    Follow Up  Return for Follow up after study.  Patient was given instructions and counseling regarding her condition or for health maintenance advice. Please see specific information pulled into the AVS if appropriate.     CHENCHO Gibbs, [facility]  Pulmonary, Critical Care Medicine, and Sleep Medicine  Electronically signed by CHENCHO Walsh, 08/22/22, 10:53 AM EDT.

## 2022-09-28 ENCOUNTER — HOSPITAL ENCOUNTER (OUTPATIENT)
Dept: SLEEP MEDICINE | Facility: HOSPITAL | Age: 62
Discharge: HOME | End: 2022-09-28
Admitting: NURSE PRACTITIONER

## 2022-09-28 DIAGNOSIS — R06.83 SNORING: ICD-10-CM

## 2022-09-28 DIAGNOSIS — G47.19 EXCESSIVE DAYTIME SLEEPINESS: ICD-10-CM

## 2022-09-28 DIAGNOSIS — R29.818 SUSPECTED SLEEP APNEA: ICD-10-CM

## 2022-09-28 PROCEDURE — 95806 SLEEP STUDY UNATT&RESP EFFT: CPT

## 2022-09-28 PROCEDURE — 95806 SLEEP STUDY UNATT&RESP EFFT: CPT | Performed by: INTERNAL MEDICINE

## 2022-10-17 NOTE — PROGRESS NOTES
Sleep Clinic Video Visit Follow Up Note    You have chosen to receive care through a telehealth visit.  Do you consent to use a video/audio connection for your medical care today? Yes       Chief Complaint  Follow up after sleep study    Subjective     History of Present Illness (from previous encounter on 8/22/2022):  Ramirez Gan is a 62 y.o. female who presents for HANS.  She has a history of hypertension, LBBB, and HL.  Patient has had worsening fatigue, palpitations, and thyroid dysfunction.  She states that she has been passing out, and lightheadedness. Her BP medication was changed and that did help. She had been on PAP therapy for obstructive sleep apnea but apparently was intolerant of CPAP.  She reports snoring, daytime sleepiness fatigue, frequent awakening, morning headaches, nonrestorative sleep, heartburn, dry mouth, sore throat, nasal allergies, grinding teeth, leg/body jerks, leg cramps, frequent nighttime urination, difficulty falling asleep, difficulty staying asleep, pain at night, sleep talking, nighttime sweats, abnormal heart rhythm.  Patient has had a sleep consultation approximately 15 years ago. She states she did use pap therapy but could not use the mask. She typically goes to bed at 10 PM and wakes at 5 AM on weekends and weekdays.  She gets approximately 4-5 hours of sleep.  It takes her 2-3 hours to initially get to sleep.  She does take naps approximately an hour. Cotton score was 14/24.  (End copied text)    -A home sleep study was obtained on 9/29/2022.    Interval History:  Ramirez Gan is a 62 y.o. female who returns for follow-up after home sleep test.  Patient was found with an AHI of 26.8/H with lowest desaturation to 72%. She has trouble with anything on her face.     Further details are as follows:    Cotton Scale is: 14/24    Weight:  Current Weight: 232 lbs    Weight change in the last year:  loss: 0 lbs    The patient's relevant past medical, surgical, family,  and social history reviewed and updated in Epic as appropriate.    PMH:    Past Medical History:   Diagnosis Date   • Acid reflux    • Anxiety    • Arthritis    • BBB (bundle branch block)     NEW L BBB DIAG. 3/2020   • Depression    • Fatigue    • Gout    • Gout    • Hyperlipidemia    • Hypertension    • Hypothyroidism    • Menopause    • Morbid obesity (HCC)    • Mumps    • HANS (obstructive sleep apnea)     UNABLE TO TOLERATE CPAP   • PONV (postoperative nausea and vomiting)    • SOB (shortness of breath)    • Vitamin D deficiency      Past Surgical History:   Procedure Laterality Date   • APPENDECTOMY     • BACK SURGERY Bilateral     RODS AND SCREWS   • CARDIAC CATHETERIZATION N/A 5/13/2021    Procedure: Left Heart Cath-;  Surgeon: Jason Estrada MD;  Location: [facility] CATH INVASIVE LOCATION;  Service: Cardiology;  Laterality: N/A;   • GALLBLADDER SURGERY     • HYSTERECTOMY     • KNEE SURGERY Bilateral      OB History    No obstetric history on file.       No Known Allergies    MEDS:  Prior to Admission medications    Medication Sig Start Date End Date Taking? Authorizing Provider   Allergy Relief 10 MG tablet Take 10 mg by mouth Daily. 7/6/22   Shailesh Gutierrez MD   allopurinol (ZYLOPRIM) 100 MG tablet Take 100 mg by mouth Every Morning.    Shailesh Gutierrez MD   amLODIPine (NORVASC) 5 MG tablet Take 5 mg by mouth Every Morning.    Shailesh Gutierrez MD   celecoxib (CeleBREX) 200 MG capsule Take 200 mg by mouth 2 (Two) Times a Day.    Shailesh Gutierrez MD   Cyanocobalamin (B-12) 1000 MCG tablet Take 1 tablet by mouth Every Morning.    Shailesh Gutierrez MD   fluconazole (DIFLUCAN) 150 MG tablet Take 150 mg by mouth Daily. 7/20/22   Shailesh Gutierrez MD   FLUoxetine (PROzac) 20 MG capsule Take 20 mg by mouth Every Night.    Shailesh Gutierrez MD   hydroCHLOROthiazide (HYDRODIURIL) 12.5 MG tablet Take 12.5 mg by mouth Every Morning.    Shailesh Gutierrez MD   ibuprofen (ADVIL,MOTRIN)  800 MG tablet  8/9/22   Shailesh Gutierrez MD   levocetirizine (XYZAL) 5 MG tablet Take 5 mg by mouth Every Evening.    Shailesh Gutierrez MD   levothyroxine (Synthroid) 75 MCG tablet Take 1 tablet by mouth Every Other Day. On empty stomach. Alternating with 88 mcg every other day 8/22/22   Jessica Dewey, DO   levothyroxine (Synthroid) 88 MCG tablet Take 1 tablet by mouth Every Other Day. Alternating with 75 mcg every other day 8/22/22 8/22/23  Jessica Dewey, DO   LORazepam (ATIVAN) 2 MG tablet  6/1/22   Shailesh Gutierrez MD   melatonin 5 MG tablet tablet Take 5-10 mg by mouth At Night As Needed.    Shailesh Gutierrez MD   rosuvastatin (CRESTOR) 10 MG tablet Take 1 tablet by mouth Every Morning. 11/24/21   Jason Estrada MD   tiZANidine (ZANAFLEX) 4 MG tablet Take 4 mg by mouth At Night As Needed for Muscle Spasms.    Shailesh Gutierrez MD   valsartan (DIOVAN) 40 MG tablet Take 1 tablet by mouth Every Morning. 8/1/22   Jason Estrada MD   vitamin D (ERGOCALCIFEROL) 1.25 MG (85245 UT) capsule capsule Take 50,000 Units by mouth 1 (One) Time Per Week. EVERY SATURDAY    Shailesh Gutierrez MD         FH:  Family History   Problem Relation Age of Onset   • Heart attack Mother    • Heart failure Father    • Heart attack Brother        Objective   Vital Signs:  Wt 105 kg (232 lb)   BMI 42.43 kg/m²           Physical Exam  Constitutional:       General: She is not in acute distress.     Appearance: Normal appearance.   Neurological:      General: No focal deficit present.      Mental Status: She is oriented to person, place, and time.   Psychiatric:         Mood and Affect: Mood normal.         Behavior: Behavior normal.               Result Review :              Assessment and Plan  This is a 62-year-old female who returns for follow-up after home sleep test.  Patient was found with moderate sleep apnea with an AHI of 26.8/H.  I have recommended PAP therapy.  The patient will return for  follow-up compliance in 31-90 days.  I discussed weight loss as it pertains to obstructive sleep apnea. We also discussed MAD and ENT referral should she not be able to tolerate a mask.    Diagnoses and all orders for this visit:    1. Obstructive sleep apnea, adult (Primary)  -     Cancel: PAP Therapy  -     PAP Therapy           Follow Up  Return for 31 to 90 days after PAP setup.  Patient was given instructions and counseling regarding her condition or for health maintenance advice. Please see specific information pulled into the AVS if appropriate.       CHENCHO Gibbs, ACNP-BC  Pulmonology, Critical Care, and Sleep Medicine  Electronically signed by CHENCHO Walsh, 10/17/22, 2:17 PM EDT.

## 2022-10-18 ENCOUNTER — TELEMEDICINE (OUTPATIENT)
Dept: SLEEP MEDICINE | Facility: HOSPITAL | Age: 62
End: 2022-10-18

## 2022-10-18 VITALS — BODY MASS INDEX: 42.43 KG/M2 | WEIGHT: 232 LBS

## 2022-10-18 DIAGNOSIS — G47.33 OBSTRUCTIVE SLEEP APNEA, ADULT: Primary | ICD-10-CM

## 2022-10-18 PROCEDURE — 99213 OFFICE O/P EST LOW 20 MIN: CPT | Performed by: NURSE PRACTITIONER

## 2022-12-07 ENCOUNTER — TRANSCRIBE ORDERS (OUTPATIENT)
Dept: ADMINISTRATIVE | Facility: HOSPITAL | Age: 62
End: 2022-12-07

## 2022-12-07 DIAGNOSIS — Z12.31 BREAST CANCER SCREENING BY MAMMOGRAM: Primary | ICD-10-CM

## 2023-01-13 RX ORDER — ROSUVASTATIN CALCIUM 10 MG/1
10 TABLET, COATED ORAL EVERY MORNING
Qty: 90 TABLET | Refills: 3 | Status: SHIPPED | OUTPATIENT
Start: 2023-01-13

## 2023-04-06 RX ORDER — VALSARTAN 40 MG/1
40 TABLET ORAL EVERY MORNING
Qty: 90 TABLET | Refills: 11 | Status: SHIPPED | OUTPATIENT
Start: 2023-04-06

## 2023-05-11 ENCOUNTER — HOSPITAL ENCOUNTER (OUTPATIENT)
Dept: MAMMOGRAPHY | Facility: HOSPITAL | Age: 63
Discharge: HOME | End: 2023-05-11
Admitting: NURSE PRACTITIONER
Payer: MEDICARE

## 2023-05-11 DIAGNOSIS — Z12.31 BREAST CANCER SCREENING BY MAMMOGRAM: ICD-10-CM

## 2023-05-11 PROCEDURE — 77067 SCR MAMMO BI INCL CAD: CPT

## 2023-05-11 PROCEDURE — 77063 BREAST TOMOSYNTHESIS BI: CPT

## 2023-05-19 ENCOUNTER — TRANSCRIBE ORDERS (OUTPATIENT)
Dept: ADMINISTRATIVE | Facility: HOSPITAL | Age: 63
End: 2023-05-19
Payer: MEDICARE

## 2023-05-19 DIAGNOSIS — R92.8 ABNORMAL MAMMOGRAM OF RIGHT BREAST: Primary | ICD-10-CM

## 2023-06-02 ENCOUNTER — HOSPITAL ENCOUNTER (OUTPATIENT)
Facility: HOSPITAL | Age: 63
Discharge: HOME | End: 2023-06-02
Payer: MEDICARE

## 2023-06-02 ENCOUNTER — OFFICE VISIT (OUTPATIENT)
Dept: CARDIOLOGY | Facility: CLINIC | Age: 63
End: 2023-06-02

## 2023-06-02 VITALS
BODY MASS INDEX: 40.48 KG/M2 | OXYGEN SATURATION: 96 % | HEART RATE: 72 BPM | WEIGHT: 220 LBS | SYSTOLIC BLOOD PRESSURE: 144 MMHG | DIASTOLIC BLOOD PRESSURE: 78 MMHG | HEIGHT: 62 IN

## 2023-06-02 DIAGNOSIS — I44.7 LBBB (LEFT BUNDLE BRANCH BLOCK): Primary | ICD-10-CM

## 2023-06-02 DIAGNOSIS — I10 ESSENTIAL HYPERTENSION: ICD-10-CM

## 2023-06-02 DIAGNOSIS — E78.2 MIXED HYPERLIPIDEMIA: ICD-10-CM

## 2023-06-02 PROCEDURE — 3077F SYST BP >= 140 MM HG: CPT | Performed by: INTERNAL MEDICINE

## 2023-06-02 PROCEDURE — 93000 ELECTROCARDIOGRAM COMPLETE: CPT | Performed by: INTERNAL MEDICINE

## 2023-06-02 PROCEDURE — 99214 OFFICE O/P EST MOD 30 MIN: CPT | Performed by: INTERNAL MEDICINE

## 2023-06-02 PROCEDURE — 93005 ELECTROCARDIOGRAM TRACING: CPT

## 2023-06-02 PROCEDURE — 3078F DIAST BP <80 MM HG: CPT | Performed by: INTERNAL MEDICINE

## 2023-06-02 RX ORDER — LEVOTHYROXINE SODIUM 112 UG/1
112 TABLET ORAL DAILY
COMMUNITY
Start: 2023-04-03

## 2023-06-02 RX ORDER — VALSARTAN 160 MG/1
160 TABLET ORAL EVERY MORNING
Qty: 90 TABLET | Refills: 1 | Status: SHIPPED | OUTPATIENT
Start: 2023-06-02

## 2023-06-02 RX ORDER — CITALOPRAM 20 MG/1
1 TABLET ORAL DAILY
COMMUNITY
Start: 2023-05-17

## 2023-06-02 RX ORDER — SEMAGLUTIDE 1.34 MG/ML
INJECTION, SOLUTION SUBCUTANEOUS
COMMUNITY
Start: 2023-02-22

## 2023-06-02 RX ORDER — METHYLPREDNISOLONE 4 MG/1
TABLET ORAL
COMMUNITY
Start: 2023-05-31

## 2023-06-02 NOTE — PROGRESS NOTES
[facility] Cardiology   [address], [address]  [place], [place], [address] [phone]  [email or web address]           OUTPATIENT CLINIC FOLLOW UP NOTE    Patient Care Team:  Patient Care Team:  Nahomy Guidry APRN as PCP - General (Nurse Practitioner)  Jessica Dewey DO as Consulting Physician (Endocrinology)    Subjective:      Chief Complaint   Patient presents with   • Hypertension       HPI:    Ramirez Gan is a 63 y.o. female.  Problem list:  1. Worsening fatigue, 3/2020  2. Palpitations  1. Heart monitor without significant arrhythmia 7/2022  3. Newly diagnosed left bundle branch block 3/2020  4. Mixed hyperlipidemia,  3/2020 lightheadedness with pravastatin  5. History of sleep apnea, did not tolerate CPAP, possible  6. Obesity, status post prior gastric surgery  7. Hypothyroidism, followed by endocrinology    She presents today for follow-up.    Denies cardiopulmonary symptoms.  Denies chest pain, unusual dyspnea.  Does not exercise regularly.  Blood pressure has been elevated at home.  Thyroid not currently well controlled    Review of Systems:  As noted in the HPI     PFSH:  Patient Active Problem List   Diagnosis   • Mixed hyperlipidemia   • LBBB (left bundle branch block)   • Anginal equivalent   • Acquired hypothyroidism   • Insomnia   • Essential hypertension   • Gout   • Prediabetes         Current Outpatient Medications:   •  allopurinol (ZYLOPRIM) 100 MG tablet, Take 1 tablet by mouth Every Morning., Disp: , Rfl:   •  celecoxib (CeleBREX) 200 MG capsule, Take 1 capsule by mouth 2 (Two) Times a Day., Disp: , Rfl:   •  citalopram (CeleXA) 20 MG tablet, Take 1 tablet by mouth Daily., Disp: , Rfl:   •  Cyanocobalamin (B-12) 1000 MCG tablet, Take 1 tablet by mouth Every Morning., Disp: , Rfl:   •  fluconazole (DIFLUCAN) 150 MG tablet, Take 1 tablet by mouth Daily., Disp: , Rfl:   •  hydroCHLOROthiazide (HYDRODIURIL) 12.5 MG tablet, Take 1 tablet by  "mouth Every Morning., Disp: , Rfl:   •  ibuprofen (ADVIL,MOTRIN) 800 MG tablet, , Disp: , Rfl:   •  levocetirizine (XYZAL) 5 MG tablet, Take 1 tablet by mouth Every Evening., Disp: , Rfl:   •  levothyroxine (SYNTHROID, LEVOTHROID) 112 MCG tablet, Take 1 tablet by mouth Daily., Disp: , Rfl:   •  melatonin 5 MG tablet tablet, Take 1-2 tablets by mouth At Night As Needed., Disp: , Rfl:   •  methylPREDNISolone (MEDROL) 4 MG dose pack, TAKE 6 TABLETS BY MOUTH ON DAY 1, THEN DECREASE DAILY DOSE BY 1 TABLET UNTIL GONE (6-5-4-3-2-1)., Disp: , Rfl:   •  Ozempic, 0.25 or 0.5 MG/DOSE, 2 MG/1.5ML solution pen-injector, INJECT 0.25 MG ONCE A WEEK FOR 30 DAYS THEN USE 0.5 MG SUBCUTANEOUSLY ONCE A WEEK FOR 30 DAYS, Disp: , Rfl:   •  rosuvastatin (CRESTOR) 10 MG tablet, TAKE 1 TABLET BY MOUTH EVERY MORNING., Disp: 90 tablet, Rfl: 3  •  tiZANidine (ZANAFLEX) 4 MG tablet, Take 1 tablet by mouth At Night As Needed for Muscle Spasms., Disp: , Rfl:   •  valsartan (DIOVAN) 160 MG tablet, Take 1 tablet by mouth Every Morning., Disp: 90 tablet, Rfl: 1  •  vitamin D (ERGOCALCIFEROL) 1.25 MG (73132 UT) capsule capsule, Take 1 capsule by mouth 1 (One) Time Per Week. EVERY SATURDAY, Disp: , Rfl:     No Known Allergies     reports that she has never smoked. She has never used smokeless tobacco.    Family History   Problem Relation Age of Onset   • Heart attack Mother    • Heart failure Father    • Heart attack Brother          Objective:   Physical exam:  /78   Pulse 72   Ht 157.5 cm (62\")   Wt 99.8 kg (220 lb)   SpO2 96%   BMI 40.24 kg/m²   CONSTITUTIONAL: No acute distress  RESPIRATORY: Normal effort. Clear to auscultation bilaterally without wheezing or rales  CARDIOVASCULAR: Carotids with normal upstrokes without bruits.  Regular rate and rhythm with normal S1 and S2. Without murmur, gallop or rub. Normal radial pulse.     Labs:      Lab Results   Component Value Date    CHOL 148 05/13/2021     Lab Results   Component Value Date "    TRIG 110 02/08/2022    TRIG 114 05/13/2021    TRIG 122 04/09/2021     Lab Results   Component Value Date    HDL 34 (L) 02/08/2022    HDL 44 05/13/2021    HDL 48 04/09/2021     Lab Results   Component Value Date    LDL 84 02/08/2022     No components found for: LDLDIRECTC    Diagnostic Data:      ECG 12 Lead    Date/Time: 6/2/2023 3:26 PM  Performed by: Jason Estrada MD  Authorized by: Jason Estrada MD   Comparison: compared with previous ECG from 3/6/2020  Similar to previous ECG  Comparison to previous ECG: Left bundle branch block  Rhythm: sinus rhythm            Nuclear stress testing 3/2020 [facility]  -Left ventricular ejection fraction of 72%.  -There is normal isotope uptake at stress and rest.  There is no evidence of myocardial ischemia or scar.  -There is a fixed anteroseptal defect consistent with breast attenuation artifact.  -Impressions are consistent with a low risk stress test.    [facility] 5/2021  · There were no hemodynamically significant, flow-limiting stenoses.  There was mild atherosclerosis.  · Normal left ventricular ejection fraction 65%    Heart monitor 7/2022, OSH: 7 days, bundle branch, sinus rhythm, 2 short AT runs lasting no longer than 6 beats, NSVT x2 runs, longest only 4 beats, rare ectopy    Assessment and Plan:       Essential hypertension  -Increasing valsartan to 160 mg daily  -Encouraged low-salt diet and exercise  -Patient to keep track of blood pressure for the next week.  We will call her in 1 week to check on her blood pressure.  If averaging above 140/90 mmHg, increase HCTZ to 25 mg daily and repeat BMP in 1 week  -Previously on amlodipine.  Unclear why it was discontinued    Palpitations  Left bundle branch block  -If with recurrent palpitations, consider a longer-term monitor, since she did not have any significant episodes while wearing her heart monitor in 7/2022    Mixed hyperlipidemia  -Patient has been tolerating Crestor.    -Recent repeat lipid panel.  We will  review and consider additional titration of rosuvastatin  -Encouraged exercise/weight loss/heart healthy diet    Sleep apnea  Sleep disturbances  -Previously referred back to sleep medicine    Obesity  -Encouraged exercise    - Return in about 6 months (around 12/2/2023) for Next scheduled follow up, or sooner if needed.      Jason Estrada MD, MSc, FACC, [facility]  Interventional Cardiology  [facility]

## 2023-06-09 ENCOUNTER — TELEPHONE (OUTPATIENT)
Dept: CARDIOLOGY | Facility: CLINIC | Age: 63
End: 2023-06-09
Payer: MEDICARE

## 2023-08-16 ENCOUNTER — TELEPHONE (OUTPATIENT)
Dept: MAMMOGRAPHY | Facility: HOSPITAL | Age: 63
End: 2023-08-16

## 2023-08-16 ENCOUNTER — HOSPITAL ENCOUNTER (OUTPATIENT)
Dept: MAMMOGRAPHY | Facility: HOSPITAL | Age: 63
Discharge: HOME | End: 2023-08-16
Admitting: NURSE PRACTITIONER
Payer: MEDICARE

## 2023-08-16 DIAGNOSIS — R92.8 ABNORMAL MAMMOGRAM OF RIGHT BREAST: ICD-10-CM

## 2023-08-16 PROCEDURE — G0279 TOMOSYNTHESIS, MAMMO: HCPCS

## 2023-08-16 PROCEDURE — 77065 DX MAMMO INCL CAD UNI: CPT

## 2023-08-16 NOTE — TELEPHONE ENCOUNTER
I spoke pipe [employer] on the phone about this patient being a BIRADS 4.  She is going to fax us an order for a right stereotatic breast biopsy.

## 2023-08-18 ENCOUNTER — HOSPITAL ENCOUNTER (OUTPATIENT)
Dept: ULTRASOUND IMAGING | Facility: HOSPITAL | Age: 63
Discharge: HOME | End: 2023-08-18
Admitting: NURSE PRACTITIONER
Payer: MEDICARE

## 2023-08-18 DIAGNOSIS — N18.30 STAGE 3 CHRONIC KIDNEY DISEASE, UNSPECIFIED WHETHER STAGE 3A OR 3B CKD: ICD-10-CM

## 2023-08-18 PROCEDURE — 76775 US EXAM ABDO BACK WALL LIM: CPT

## 2023-09-05 ENCOUNTER — HOSPITAL ENCOUNTER (OUTPATIENT)
Dept: MAMMOGRAPHY | Facility: HOSPITAL | Age: 63
Discharge: HOME | End: 2023-09-05
Payer: MEDICARE

## 2023-09-05 DIAGNOSIS — R92.8 ABNORMAL MAMMOGRAM OF RIGHT BREAST: ICD-10-CM

## 2023-09-05 PROCEDURE — A4648 IMPLANTABLE TISSUE MARKER: HCPCS

## 2023-09-05 PROCEDURE — 76098 X-RAY EXAM SURGICAL SPECIMEN: CPT

## 2023-09-06 LAB — REF LAB TEST METHOD: NORMAL

## 2023-10-06 ENCOUNTER — HOSPITAL ENCOUNTER (OUTPATIENT)
Facility: HOSPITAL | Age: 63
Discharge: HOME | End: 2023-10-06
Payer: MEDICARE

## 2023-10-06 LAB
25(OH)D3 SERPL-MCNC: 73.8 NG/ML (ref 32–100)
ALBUMIN SERPL-MCNC: 4.2 G/DL (ref 3.4–4.8)
ALBUMIN/GLOB SERPL: 2.2 {RATIO} (ref 0.8–2)
ALP SERPL-CCNC: 118 U/L (ref 25–100)
ALT SERPL-CCNC: 17 U/L (ref 4–36)
ANION GAP SERPL CALCULATED.3IONS-SCNC: 11 MMOL/L (ref 3–16)
AST SERPL-CCNC: 20 U/L (ref 8–33)
BASOPHILS # BLD: 0 K/UL (ref 0–0.1)
BASOPHILS NFR BLD: 0.6 %
BILIRUB SERPL-MCNC: 0.5 MG/DL (ref 0.3–1.2)
BUN SERPL-MCNC: 16 MG/DL (ref 6–20)
CALCIUM SERPL-MCNC: 9.2 MG/DL (ref 8.5–10.5)
CHLORIDE SERPL-SCNC: 108 MMOL/L (ref 98–107)
CHOLEST SERPL-MCNC: 168 MG/DL (ref 0–200)
CO2 SERPL-SCNC: 22 MMOL/L (ref 20–30)
CREAT SERPL-MCNC: 1.4 MG/DL (ref 0.4–1.2)
EOSINOPHIL # BLD: 0.2 K/UL (ref 0–0.4)
EOSINOPHIL NFR BLD: 3.4 %
ERYTHROCYTE [DISTWIDTH] IN BLOOD BY AUTOMATED COUNT: 13.8 % (ref 11–16)
FOLATE SERPL-MCNC: 7.5 NG/ML
GFR SERPLBLD CREATININE-BSD FMLA CKD-EPI: 42 ML/MIN/{1.73_M2}
GLOBULIN SER CALC-MCNC: 1.9 G/DL
GLUCOSE SERPL-MCNC: 113 MG/DL (ref 74–106)
HBA1C MFR BLD: 5.5 %
HCT VFR BLD AUTO: 39.7 % (ref 37–47)
HDLC SERPL-MCNC: 45 MG/DL (ref 40–60)
HGB BLD-MCNC: 12.6 G/DL (ref 11.5–16.5)
IMM GRANULOCYTES # BLD: 0 K/UL
IMM GRANULOCYTES NFR BLD: 0.4 % (ref 0–5)
LDLC SERPL CALC-MCNC: 102 MG/DL
LYMPHOCYTES # BLD: 1.7 K/UL (ref 1.5–4)
LYMPHOCYTES NFR BLD: 34.1 %
MCH RBC QN AUTO: 29 PG (ref 27–32)
MCHC RBC AUTO-ENTMCNC: 31.7 G/DL (ref 31–35)
MCV RBC AUTO: 91.3 FL (ref 80–100)
MONOCYTES # BLD: 0.3 K/UL (ref 0.2–0.8)
MONOCYTES NFR BLD: 6.8 %
NEUTROPHILS # BLD: 2.7 K/UL (ref 2–7.5)
NEUTS SEG NFR BLD: 54.7 %
PLATELET # BLD AUTO: 193 K/UL (ref 150–400)
PMV BLD AUTO: 11.2 FL (ref 6–10)
POTASSIUM SERPL-SCNC: 4.1 MMOL/L (ref 3.4–5.1)
PROT SERPL-MCNC: 6.1 G/DL (ref 6.4–8.3)
RBC # BLD AUTO: 4.35 M/UL (ref 3.8–5.8)
SODIUM SERPL-SCNC: 141 MMOL/L (ref 136–145)
TRIGL SERPL-MCNC: 107 MG/DL (ref 0–249)
TSH SERPL DL<=0.005 MIU/L-ACNC: 0.14 UIU/ML (ref 0.27–4.2)
VIT B12 SERPL-MCNC: 1374 PG/ML (ref 211–911)
VLDLC SERPL CALC-MCNC: 21 MG/DL
WBC # BLD AUTO: 5 K/UL (ref 4–11)

## 2023-10-06 PROCEDURE — 84443 ASSAY THYROID STIM HORMONE: CPT

## 2023-10-06 PROCEDURE — 85025 COMPLETE CBC W/AUTO DIFF WBC: CPT

## 2023-10-06 PROCEDURE — 80061 LIPID PANEL: CPT

## 2023-10-06 PROCEDURE — 82746 ASSAY OF FOLIC ACID SERUM: CPT

## 2023-10-06 PROCEDURE — 83036 HEMOGLOBIN GLYCOSYLATED A1C: CPT

## 2023-10-06 PROCEDURE — 36415 COLL VENOUS BLD VENIPUNCTURE: CPT

## 2023-10-06 PROCEDURE — 82306 VITAMIN D 25 HYDROXY: CPT

## 2023-10-06 PROCEDURE — 82607 VITAMIN B-12: CPT

## 2023-10-06 PROCEDURE — 80053 COMPREHEN METABOLIC PANEL: CPT

## 2023-10-17 RX ORDER — VALSARTAN 160 MG/1
160 TABLET ORAL EVERY MORNING
Qty: 90 TABLET | Refills: 1 | Status: SHIPPED | OUTPATIENT
Start: 2023-10-17

## 2024-01-17 ENCOUNTER — HOSPITAL ENCOUNTER (OUTPATIENT)
Facility: HOSPITAL | Age: 64
Discharge: HOME | End: 2024-01-17
Payer: MEDICARE

## 2024-01-17 LAB
25(OH)D3 SERPL-MCNC: 67.1 NG/ML (ref 32–100)
ALBUMIN SERPL-MCNC: 4.1 G/DL (ref 3.4–4.8)
ALBUMIN/GLOB SERPL: 1.8 {RATIO} (ref 0.8–2)
ALP SERPL-CCNC: 151 U/L (ref 25–100)
ALT SERPL-CCNC: 9 U/L (ref 4–36)
ANION GAP SERPL CALCULATED.3IONS-SCNC: 10 MMOL/L (ref 3–16)
AST SERPL-CCNC: 17 U/L (ref 8–33)
BASOPHILS # BLD: 0 K/UL (ref 0–0.1)
BASOPHILS NFR BLD: 0.7 %
BILIRUB SERPL-MCNC: 0.3 MG/DL (ref 0.3–1.2)
BUN SERPL-MCNC: 16 MG/DL (ref 6–20)
CALCIUM SERPL-MCNC: 9.2 MG/DL (ref 8.5–10.5)
CHLORIDE SERPL-SCNC: 107 MMOL/L (ref 98–107)
CHOLEST SERPL-MCNC: 213 MG/DL (ref 0–200)
CO2 SERPL-SCNC: 22 MMOL/L (ref 20–30)
CREAT SERPL-MCNC: 1.3 MG/DL (ref 0.4–1.2)
EOSINOPHIL # BLD: 0.2 K/UL (ref 0–0.4)
EOSINOPHIL NFR BLD: 2.8 %
ERYTHROCYTE [DISTWIDTH] IN BLOOD BY AUTOMATED COUNT: 13.3 % (ref 11–16)
FOLATE SERPL-MCNC: 7.58 NG/ML
GFR SERPLBLD CREATININE-BSD FMLA CKD-EPI: 46 ML/MIN/{1.73_M2}
GLOBULIN SER CALC-MCNC: 2.3 G/DL
GLUCOSE SERPL-MCNC: 99 MG/DL (ref 74–106)
HBA1C MFR BLD: 5.8 %
HCT VFR BLD AUTO: 41.6 % (ref 37–47)
HDLC SERPL-MCNC: 46 MG/DL (ref 40–60)
HGB BLD-MCNC: 13.2 G/DL (ref 11.5–16.5)
IMM GRANULOCYTES # BLD: 0 K/UL
IMM GRANULOCYTES NFR BLD: 0.2 % (ref 0–5)
LDLC SERPL CALC-MCNC: 145 MG/DL
LYMPHOCYTES # BLD: 1.6 K/UL (ref 1.5–4)
LYMPHOCYTES NFR BLD: 29.4 %
MCH RBC QN AUTO: 27.8 PG (ref 27–32)
MCHC RBC AUTO-ENTMCNC: 31.7 G/DL (ref 31–35)
MCV RBC AUTO: 87.6 FL (ref 80–100)
MONOCYTES # BLD: 0.3 K/UL (ref 0.2–0.8)
MONOCYTES NFR BLD: 4.7 %
NEUTROPHILS # BLD: 3.3 K/UL (ref 2–7.5)
NEUTS SEG NFR BLD: 62.2 %
PLATELET # BLD AUTO: 214 K/UL (ref 150–400)
PMV BLD AUTO: 11.9 FL (ref 6–10)
POTASSIUM SERPL-SCNC: 4.2 MMOL/L (ref 3.4–5.1)
PROT SERPL-MCNC: 6.4 G/DL (ref 6.4–8.3)
RBC # BLD AUTO: 4.75 M/UL (ref 3.8–5.8)
SODIUM SERPL-SCNC: 139 MMOL/L (ref 136–145)
TRIGL SERPL-MCNC: 108 MG/DL (ref 0–249)
TSH SERPL DL<=0.005 MIU/L-ACNC: 17.71 UIU/ML (ref 0.27–4.2)
VIT B12 SERPL-MCNC: >2000 PG/ML (ref 211–911)
VLDLC SERPL CALC-MCNC: 22 MG/DL
WBC # BLD AUTO: 5.3 K/UL (ref 4–11)

## 2024-01-17 PROCEDURE — 82607 VITAMIN B-12: CPT

## 2024-01-17 PROCEDURE — 85025 COMPLETE CBC W/AUTO DIFF WBC: CPT

## 2024-01-17 PROCEDURE — 82306 VITAMIN D 25 HYDROXY: CPT

## 2024-01-17 PROCEDURE — 80053 COMPREHEN METABOLIC PANEL: CPT

## 2024-01-17 PROCEDURE — 82746 ASSAY OF FOLIC ACID SERUM: CPT

## 2024-01-17 PROCEDURE — 80061 LIPID PANEL: CPT

## 2024-01-17 PROCEDURE — 83036 HEMOGLOBIN GLYCOSYLATED A1C: CPT

## 2024-01-17 PROCEDURE — 84443 ASSAY THYROID STIM HORMONE: CPT

## 2024-04-11 RX ORDER — VALSARTAN 160 MG/1
160 TABLET ORAL EVERY MORNING
Qty: 90 TABLET | Refills: 1 | OUTPATIENT
Start: 2024-04-11

## 2024-06-14 ENCOUNTER — HOSPITAL ENCOUNTER (OUTPATIENT)
Facility: HOSPITAL | Age: 64
Discharge: HOME | End: 2024-06-14
Payer: MEDICARE

## 2024-06-14 LAB
25(OH)D3 SERPL-MCNC: 91.6 NG/ML (ref 32–100)
ALBUMIN SERPL-MCNC: 4.1 G/DL (ref 3.4–4.8)
ALBUMIN/GLOB SERPL: 2 {RATIO} (ref 0.8–2)
ALP SERPL-CCNC: 154 U/L (ref 25–100)
ALT SERPL-CCNC: 18 U/L (ref 4–36)
ANION GAP SERPL CALCULATED.3IONS-SCNC: 14 MMOL/L (ref 3–16)
AST SERPL-CCNC: 21 U/L (ref 8–33)
BASOPHILS # BLD: 0 K/UL (ref 0–0.1)
BASOPHILS NFR BLD: 0.7 %
BILIRUB SERPL-MCNC: 0.5 MG/DL (ref 0.3–1.2)
BUN SERPL-MCNC: 21 MG/DL (ref 6–20)
CALCIUM SERPL-MCNC: 9 MG/DL (ref 8.5–10.5)
CHLORIDE SERPL-SCNC: 108 MMOL/L (ref 98–107)
CHOLEST SERPL-MCNC: 146 MG/DL (ref 0–200)
CO2 SERPL-SCNC: 20 MMOL/L (ref 20–30)
CREAT SERPL-MCNC: 1.3 MG/DL (ref 0.4–1.2)
EOSINOPHIL # BLD: 0.3 K/UL (ref 0–0.4)
EOSINOPHIL NFR BLD: 5.7 %
ERYTHROCYTE [DISTWIDTH] IN BLOOD BY AUTOMATED COUNT: 13.4 % (ref 11–16)
FOLATE SERPL-MCNC: 7.88 NG/ML
GFR SERPLBLD CREATININE-BSD FMLA CKD-EPI: 46 ML/MIN/{1.73_M2}
GLOBULIN SER CALC-MCNC: 2.1 G/DL
GLUCOSE SERPL-MCNC: 108 MG/DL (ref 74–106)
HBA1C MFR BLD: 5.4 %
HCT VFR BLD AUTO: 41.9 % (ref 37–47)
HDLC SERPL-MCNC: 50 MG/DL (ref 40–60)
HGB BLD-MCNC: 13.2 G/DL (ref 11.5–16.5)
IMM GRANULOCYTES # BLD: 0 K/UL
IMM GRANULOCYTES NFR BLD: 0.2 % (ref 0–5)
LDLC SERPL CALC-MCNC: 77 MG/DL
LYMPHOCYTES # BLD: 1.5 K/UL (ref 1.5–4)
LYMPHOCYTES NFR BLD: 33.3 %
MAGNESIUM SERPL-MCNC: 2 MG/DL (ref 1.7–2.4)
MCH RBC QN AUTO: 28.2 PG (ref 27–32)
MCHC RBC AUTO-ENTMCNC: 31.5 G/DL (ref 31–35)
MCV RBC AUTO: 89.5 FL (ref 80–100)
MONOCYTES # BLD: 0.3 K/UL (ref 0.2–0.8)
MONOCYTES NFR BLD: 6.6 %
NEUTROPHILS # BLD: 2.3 K/UL (ref 2–7.5)
NEUTS SEG NFR BLD: 53.5 %
PLATELET # BLD AUTO: 208 K/UL (ref 150–400)
PMV BLD AUTO: 11.8 FL (ref 6–10)
POTASSIUM SERPL-SCNC: 4.4 MMOL/L (ref 3.4–5.1)
PROT SERPL-MCNC: 6.2 G/DL (ref 6.4–8.3)
RBC # BLD AUTO: 4.68 M/UL (ref 3.8–5.8)
SODIUM SERPL-SCNC: 142 MMOL/L (ref 136–145)
TRIGL SERPL-MCNC: 96 MG/DL (ref 0–249)
TSH SERPL DL<=0.005 MIU/L-ACNC: <0.01 UIU/ML (ref 0.27–4.2)
VIT B12 SERPL-MCNC: 706 PG/ML (ref 211–911)
VLDLC SERPL CALC-MCNC: 19 MG/DL
WBC # BLD AUTO: 4.4 K/UL (ref 4–11)

## 2024-06-14 PROCEDURE — 85025 COMPLETE CBC W/AUTO DIFF WBC: CPT

## 2024-06-14 PROCEDURE — 84443 ASSAY THYROID STIM HORMONE: CPT

## 2024-06-14 PROCEDURE — 82607 VITAMIN B-12: CPT

## 2024-06-14 PROCEDURE — 80061 LIPID PANEL: CPT

## 2024-06-14 PROCEDURE — 82746 ASSAY OF FOLIC ACID SERUM: CPT

## 2024-06-14 PROCEDURE — 83036 HEMOGLOBIN GLYCOSYLATED A1C: CPT

## 2024-06-14 PROCEDURE — 83735 ASSAY OF MAGNESIUM: CPT

## 2024-06-14 PROCEDURE — 80053 COMPREHEN METABOLIC PANEL: CPT

## 2024-06-14 PROCEDURE — 82306 VITAMIN D 25 HYDROXY: CPT

## 2024-08-30 ENCOUNTER — HOSPITAL ENCOUNTER (OUTPATIENT)
Dept: NURSING | Facility: HOSPITAL | Age: 64
Setting detail: INFUSION SERIES
End: 2024-08-30
Payer: MEDICARE

## 2024-08-30 ENCOUNTER — HOSPITAL ENCOUNTER (OUTPATIENT)
Facility: HOSPITAL | Age: 64
Discharge: HOME | End: 2024-08-30
Payer: MEDICARE

## 2024-08-30 VITALS
DIASTOLIC BLOOD PRESSURE: 88 MMHG | TEMPERATURE: 98.2 F | HEART RATE: 86 BPM | RESPIRATION RATE: 20 BRPM | SYSTOLIC BLOOD PRESSURE: 137 MMHG | OXYGEN SATURATION: 98 %

## 2024-08-30 LAB
25(OH)D3 SERPL-MCNC: 92.1 NG/ML (ref 32–100)
ALBUMIN SERPL-MCNC: 4.1 G/DL (ref 3.4–4.8)
ALBUMIN/GLOB SERPL: 2.1 {RATIO} (ref 0.8–2)
ALP SERPL-CCNC: 160 U/L (ref 25–100)
ALT SERPL-CCNC: 25 U/L (ref 4–36)
ANION GAP SERPL CALCULATED.3IONS-SCNC: 13 MMOL/L (ref 3–16)
AST SERPL-CCNC: 25 U/L (ref 8–33)
BASOPHILS # BLD: 0 K/UL (ref 0–0.1)
BASOPHILS NFR BLD: 0.4 %
BILIRUB SERPL-MCNC: 0.5 MG/DL (ref 0.3–1.2)
BUN SERPL-MCNC: 19 MG/DL (ref 6–20)
CALCIUM SERPL-MCNC: 9.8 MG/DL (ref 8.5–10.5)
CHLORIDE SERPL-SCNC: 107 MMOL/L (ref 98–107)
CHOLEST SERPL-MCNC: 131 MG/DL (ref 0–200)
CO2 SERPL-SCNC: 21 MMOL/L (ref 20–30)
CREAT SERPL-MCNC: 1.2 MG/DL (ref 0.4–1.2)
EOSINOPHIL # BLD: 0.1 K/UL (ref 0–0.4)
EOSINOPHIL NFR BLD: 2.6 %
ERYTHROCYTE [DISTWIDTH] IN BLOOD BY AUTOMATED COUNT: 14 % (ref 11–16)
FOLATE SERPL-MCNC: 8.06 NG/ML
GFR SERPLBLD CREATININE-BSD FMLA CKD-EPI: 51 ML/MIN/{1.73_M2}
GLOBULIN SER CALC-MCNC: 2 G/DL
GLUCOSE SERPL-MCNC: 118 MG/DL (ref 74–106)
HBA1C MFR BLD: 5.6 %
HCT VFR BLD AUTO: 42 % (ref 37–47)
HDLC SERPL-MCNC: 44 MG/DL (ref 40–60)
HGB BLD-MCNC: 13.3 G/DL (ref 11.5–16.5)
IMM GRANULOCYTES # BLD: 0 K/UL
IMM GRANULOCYTES NFR BLD: 0.2 % (ref 0–5)
LDLC SERPL CALC-MCNC: 69 MG/DL
LYMPHOCYTES # BLD: 1.3 K/UL (ref 1.5–4)
LYMPHOCYTES NFR BLD: 24.9 %
MCH RBC QN AUTO: 28.3 PG (ref 27–32)
MCHC RBC AUTO-ENTMCNC: 31.7 G/DL (ref 31–35)
MCV RBC AUTO: 89.4 FL (ref 80–100)
MONOCYTES # BLD: 0.3 K/UL (ref 0.2–0.8)
MONOCYTES NFR BLD: 6.2 %
NEUTROPHILS # BLD: 3.5 K/UL (ref 2–7.5)
NEUTS SEG NFR BLD: 65.7 %
PLATELET # BLD AUTO: 210 K/UL (ref 150–400)
PMV BLD AUTO: 12.1 FL (ref 6–10)
POTASSIUM SERPL-SCNC: 4.4 MMOL/L (ref 3.4–5.1)
PROT SERPL-MCNC: 6.1 G/DL (ref 6.4–8.3)
RBC # BLD AUTO: 4.7 M/UL (ref 3.8–5.8)
SODIUM SERPL-SCNC: 141 MMOL/L (ref 136–145)
TRIGL SERPL-MCNC: 88 MG/DL (ref 0–249)
TSH SERPL DL<=0.005 MIU/L-ACNC: <0.01 UIU/ML (ref 0.27–4.2)
VIT B12 SERPL-MCNC: 801 PG/ML (ref 211–911)
VLDLC SERPL CALC-MCNC: 18 MG/DL
WBC # BLD AUTO: 5.3 K/UL (ref 4–11)

## 2024-08-30 PROCEDURE — 82306 VITAMIN D 25 HYDROXY: CPT

## 2024-08-30 PROCEDURE — 84443 ASSAY THYROID STIM HORMONE: CPT

## 2024-08-30 PROCEDURE — 36415 COLL VENOUS BLD VENIPUNCTURE: CPT

## 2024-08-30 PROCEDURE — 6360000002 HC RX W HCPCS: Performed by: NURSE PRACTITIONER

## 2024-08-30 PROCEDURE — 85025 COMPLETE CBC W/AUTO DIFF WBC: CPT

## 2024-08-30 PROCEDURE — 2580000003 HC RX 258: Performed by: NURSE PRACTITIONER

## 2024-08-30 PROCEDURE — 82607 VITAMIN B-12: CPT

## 2024-08-30 PROCEDURE — 96365 THER/PROPH/DIAG IV INF INIT: CPT

## 2024-08-30 PROCEDURE — 83036 HEMOGLOBIN GLYCOSYLATED A1C: CPT

## 2024-08-30 PROCEDURE — 80053 COMPREHEN METABOLIC PANEL: CPT

## 2024-08-30 PROCEDURE — 82746 ASSAY OF FOLIC ACID SERUM: CPT

## 2024-08-30 PROCEDURE — 80061 LIPID PANEL: CPT

## 2024-08-30 RX ORDER — ONDANSETRON 2 MG/ML
4 INJECTION INTRAMUSCULAR; INTRAVENOUS ONCE
Status: COMPLETED | OUTPATIENT
Start: 2024-08-30 | End: 2024-08-30

## 2024-08-30 RX ORDER — 0.9 % SODIUM CHLORIDE 0.9 %
1000 INTRAVENOUS SOLUTION INTRAVENOUS ONCE
Status: COMPLETED | OUTPATIENT
Start: 2024-08-30 | End: 2024-08-30

## 2024-08-30 RX ADMIN — ONDANSETRON 4 MG: 2 INJECTION, SOLUTION INTRAMUSCULAR; INTRAVENOUS at 12:07

## 2024-08-30 RX ADMIN — SODIUM CHLORIDE 1000 ML: 9 INJECTION, SOLUTION INTRAVENOUS at 12:09

## 2024-08-30 NOTE — PROGRESS NOTES
Pt here for infusion.  20G IV started in RAC.  Flushes well.  Zofran given per orders and infusion started at this time.  Pt given call bell for any needs.

## 2024-10-15 ENCOUNTER — HOSPITAL ENCOUNTER (OUTPATIENT)
Facility: HOSPITAL | Age: 64
Discharge: HOME | End: 2024-10-15
Payer: MEDICARE

## 2024-10-15 LAB
25(OH)D3 SERPL-MCNC: 95.5 NG/ML (ref 32–100)
ALBUMIN SERPL-MCNC: 4 G/DL (ref 3.4–4.8)
ALBUMIN/GLOB SERPL: 1.9 {RATIO} (ref 0.8–2)
ALP SERPL-CCNC: 177 U/L (ref 25–100)
ALT SERPL-CCNC: 16 U/L (ref 4–36)
ANION GAP SERPL CALCULATED.3IONS-SCNC: 14 MMOL/L (ref 3–16)
AST SERPL-CCNC: 23 U/L (ref 8–33)
BASOPHILS # BLD: 0 K/UL (ref 0–0.1)
BASOPHILS NFR BLD: 0.8 %
BILIRUB SERPL-MCNC: 0.6 MG/DL (ref 0.3–1.2)
BUN SERPL-MCNC: 21 MG/DL (ref 6–20)
CALCIUM SERPL-MCNC: 9.5 MG/DL (ref 8.5–10.5)
CHLORIDE SERPL-SCNC: 108 MMOL/L (ref 98–107)
CHOLEST SERPL-MCNC: 143 MG/DL (ref 0–200)
CO2 SERPL-SCNC: 20 MMOL/L (ref 20–30)
CREAT SERPL-MCNC: 1.3 MG/DL (ref 0.4–1.2)
EOSINOPHIL # BLD: 0.1 K/UL (ref 0–0.4)
EOSINOPHIL NFR BLD: 2.4 %
ERYTHROCYTE [DISTWIDTH] IN BLOOD BY AUTOMATED COUNT: 13.7 % (ref 11–16)
FOLATE SERPL-MCNC: 10.8 NG/ML
GFR SERPLBLD CREATININE-BSD FMLA CKD-EPI: 46 ML/MIN/{1.73_M2}
GLOBULIN SER CALC-MCNC: 2.1 G/DL
GLUCOSE SERPL-MCNC: 106 MG/DL (ref 74–106)
HBA1C MFR BLD: 5.7 %
HCT VFR BLD AUTO: 41.7 % (ref 37–47)
HDLC SERPL-MCNC: 53 MG/DL (ref 40–60)
HGB BLD-MCNC: 13.4 G/DL (ref 11.5–16.5)
IMM GRANULOCYTES # BLD: 0 K/UL
IMM GRANULOCYTES NFR BLD: 0.2 % (ref 0–5)
LDLC SERPL CALC-MCNC: 76 MG/DL
LYMPHOCYTES # BLD: 1.4 K/UL (ref 1.5–4)
LYMPHOCYTES NFR BLD: 27.5 %
MCH RBC QN AUTO: 28.9 PG (ref 27–32)
MCHC RBC AUTO-ENTMCNC: 32.1 G/DL (ref 31–35)
MCV RBC AUTO: 89.9 FL (ref 80–100)
MONOCYTES # BLD: 0.3 K/UL (ref 0.2–0.8)
MONOCYTES NFR BLD: 5.5 %
NEUTROPHILS # BLD: 3.2 K/UL (ref 2–7.5)
NEUTS SEG NFR BLD: 63.6 %
PLATELET # BLD AUTO: 199 K/UL (ref 150–400)
PMV BLD AUTO: 11.5 FL (ref 6–10)
POTASSIUM SERPL-SCNC: 4.3 MMOL/L (ref 3.4–5.1)
PROT SERPL-MCNC: 6.1 G/DL (ref 6.4–8.3)
RBC # BLD AUTO: 4.64 M/UL (ref 3.8–5.8)
SODIUM SERPL-SCNC: 142 MMOL/L (ref 136–145)
TRIGL SERPL-MCNC: 70 MG/DL (ref 0–249)
TSH SERPL DL<=0.005 MIU/L-ACNC: 0.03 UIU/ML (ref 0.27–4.2)
VIT B12 SERPL-MCNC: 2000 PG/ML (ref 211–911)
VLDLC SERPL CALC-MCNC: 14 MG/DL
WBC # BLD AUTO: 5.1 K/UL (ref 4–11)

## 2024-10-15 PROCEDURE — 82746 ASSAY OF FOLIC ACID SERUM: CPT

## 2024-10-15 PROCEDURE — 83036 HEMOGLOBIN GLYCOSYLATED A1C: CPT

## 2024-10-15 PROCEDURE — 82607 VITAMIN B-12: CPT

## 2024-10-15 PROCEDURE — 82306 VITAMIN D 25 HYDROXY: CPT

## 2024-10-15 PROCEDURE — 80061 LIPID PANEL: CPT

## 2024-10-15 PROCEDURE — 85025 COMPLETE CBC W/AUTO DIFF WBC: CPT

## 2024-10-15 PROCEDURE — 36415 COLL VENOUS BLD VENIPUNCTURE: CPT

## 2024-10-15 PROCEDURE — 84443 ASSAY THYROID STIM HORMONE: CPT

## 2024-10-15 PROCEDURE — 80053 COMPREHEN METABOLIC PANEL: CPT

## 2024-11-15 ENCOUNTER — HOSPITAL ENCOUNTER (OUTPATIENT)
Facility: HOSPITAL | Age: 64
Discharge: HOME | End: 2024-11-15
Payer: MEDICARE

## 2024-11-15 LAB
FOLATE SERPL-MCNC: 7.43 NG/ML
TSH SERPL DL<=0.005 MIU/L-ACNC: 0.09 UIU/ML (ref 0.27–4.2)

## 2024-11-15 PROCEDURE — 36415 COLL VENOUS BLD VENIPUNCTURE: CPT

## 2024-11-15 PROCEDURE — 82746 ASSAY OF FOLIC ACID SERUM: CPT

## 2024-11-15 PROCEDURE — 84443 ASSAY THYROID STIM HORMONE: CPT

## 2025-02-21 RX ORDER — ROSUVASTATIN CALCIUM 10 MG/1
10 TABLET, COATED ORAL EVERY MORNING
Qty: 90 TABLET | Refills: 3 | OUTPATIENT
Start: 2025-02-21

## 2025-02-21 RX ORDER — VALSARTAN 160 MG/1
160 TABLET ORAL EVERY MORNING
Qty: 90 TABLET | Refills: 1 | OUTPATIENT
Start: 2025-02-21

## 2025-03-14 ENCOUNTER — HOSPITAL ENCOUNTER (OUTPATIENT)
Dept: GENERAL RADIOLOGY | Facility: HOSPITAL | Age: 65
Discharge: HOME | End: 2025-03-14
Payer: MEDICARE

## 2025-03-14 ENCOUNTER — HOSPITAL ENCOUNTER (OUTPATIENT)
Facility: HOSPITAL | Age: 65
Discharge: HOME | End: 2025-03-14
Payer: MEDICARE

## 2025-03-14 DIAGNOSIS — M25.532 LEFT WRIST PAIN: ICD-10-CM

## 2025-03-14 DIAGNOSIS — M25.522 LEFT ELBOW PAIN: ICD-10-CM

## 2025-03-14 PROCEDURE — 73110 X-RAY EXAM OF WRIST: CPT

## 2025-03-14 PROCEDURE — 73070 X-RAY EXAM OF ELBOW: CPT

## 2025-03-18 ENCOUNTER — HOSPITAL ENCOUNTER (OUTPATIENT)
Facility: HOSPITAL | Age: 65
Discharge: HOME | End: 2025-03-18
Payer: MEDICARE

## 2025-03-18 LAB
T4 FREE SERPL-MCNC: 1.3 NG/DL (ref 0.92–1.68)
TSH SERPL DL<=0.005 MIU/L-ACNC: 2.47 UIU/ML (ref 0.27–4.2)

## 2025-03-18 PROCEDURE — 86376 MICROSOMAL ANTIBODY EACH: CPT

## 2025-03-18 PROCEDURE — 84439 ASSAY OF FREE THYROXINE: CPT

## 2025-03-18 PROCEDURE — 36415 COLL VENOUS BLD VENIPUNCTURE: CPT

## 2025-03-18 PROCEDURE — 84443 ASSAY THYROID STIM HORMONE: CPT

## 2025-03-19 LAB — THYROPEROXIDASE AB SERPL IA-ACNC: 20 IU/ML

## 2025-03-26 ENCOUNTER — HOSPITAL ENCOUNTER (OUTPATIENT)
Facility: HOSPITAL | Age: 65
Discharge: HOME | End: 2025-03-26
Payer: MEDICARE

## 2025-03-26 LAB
25(OH)D3 SERPL-MCNC: 107 NG/ML (ref 32–100)
ALBUMIN SERPL-MCNC: 3.9 G/DL (ref 3.4–4.8)
ALBUMIN/GLOB SERPL: 1.6 {RATIO} (ref 0.8–2)
ALP SERPL-CCNC: 140 U/L (ref 25–100)
ALT SERPL-CCNC: 17 U/L (ref 4–36)
ANION GAP SERPL CALCULATED.3IONS-SCNC: 11 MMOL/L (ref 3–16)
AST SERPL-CCNC: 22 U/L (ref 8–33)
BASOPHILS # BLD: 0 K/UL (ref 0–0.1)
BASOPHILS NFR BLD: 0.6 %
BILIRUB SERPL-MCNC: 0.5 MG/DL (ref 0.3–1.2)
BUN SERPL-MCNC: 14 MG/DL (ref 6–20)
CALCIUM SERPL-MCNC: 9.2 MG/DL (ref 8.3–10.6)
CHLORIDE SERPL-SCNC: 109 MMOL/L (ref 98–107)
CHOLEST SERPL-MCNC: 113 MG/DL (ref 0–200)
CO2 SERPL-SCNC: 22 MMOL/L (ref 20–30)
CREAT SERPL-MCNC: 1.5 MG/DL (ref 0.4–1.2)
EOSINOPHIL # BLD: 0.2 K/UL (ref 0–0.4)
EOSINOPHIL NFR BLD: 2.8 %
ERYTHROCYTE [DISTWIDTH] IN BLOOD BY AUTOMATED COUNT: 13.8 % (ref 11–16)
FOLATE SERPL-MCNC: 7.94 NG/ML
GFR SERPLBLD CREATININE-BSD FMLA CKD-EPI: 39 ML/MIN/{1.73_M2}
GLOBULIN SER CALC-MCNC: 2.5 G/DL
GLUCOSE SERPL-MCNC: 88 MG/DL (ref 74–106)
HBA1C MFR BLD: 5.7 %
HCT VFR BLD AUTO: 39 % (ref 37–47)
HDLC SERPL-MCNC: 41 MG/DL (ref 40–60)
HGB BLD-MCNC: 12.2 G/DL (ref 11.5–16.5)
IMM GRANULOCYTES # BLD: 0 K/UL
IMM GRANULOCYTES NFR BLD: 0.4 % (ref 0–5)
LDLC SERPL CALC-MCNC: 59 MG/DL
LYMPHOCYTES # BLD: 1.5 K/UL (ref 1.5–4)
LYMPHOCYTES NFR BLD: 28.2 %
MCH RBC QN AUTO: 29.1 PG (ref 27–32)
MCHC RBC AUTO-ENTMCNC: 31.3 G/DL (ref 31–35)
MCV RBC AUTO: 93.1 FL (ref 80–100)
MONOCYTES # BLD: 0.3 K/UL (ref 0.2–0.8)
MONOCYTES NFR BLD: 5.9 %
NEUTROPHILS # BLD: 3.3 K/UL (ref 2–7.5)
NEUTS SEG NFR BLD: 62.1 %
PLATELET # BLD AUTO: 193 K/UL (ref 150–400)
PMV BLD AUTO: 11.7 FL (ref 6–10)
POTASSIUM SERPL-SCNC: 4.2 MMOL/L (ref 3.4–5.1)
PROT SERPL-MCNC: 6.4 G/DL (ref 6.4–8.3)
RBC # BLD AUTO: 4.19 M/UL (ref 3.8–5.8)
SODIUM SERPL-SCNC: 142 MMOL/L (ref 136–145)
TRIGL SERPL-MCNC: 66 MG/DL (ref 0–249)
TSH SERPL DL<=0.005 MIU/L-ACNC: 4.15 UIU/ML (ref 0.27–4.2)
VIT B12 SERPL-MCNC: 1014 PG/ML (ref 211–911)
VLDLC SERPL CALC-MCNC: 13 MG/DL
WBC # BLD AUTO: 5.3 K/UL (ref 4–11)

## 2025-03-26 PROCEDURE — 82607 VITAMIN B-12: CPT

## 2025-03-26 PROCEDURE — 82746 ASSAY OF FOLIC ACID SERUM: CPT

## 2025-03-26 PROCEDURE — 80053 COMPREHEN METABOLIC PANEL: CPT

## 2025-03-26 PROCEDURE — 84443 ASSAY THYROID STIM HORMONE: CPT

## 2025-03-26 PROCEDURE — 83001 ASSAY OF GONADOTROPIN (FSH): CPT

## 2025-03-26 PROCEDURE — 83002 ASSAY OF GONADOTROPIN (LH): CPT

## 2025-03-26 PROCEDURE — 82306 VITAMIN D 25 HYDROXY: CPT

## 2025-03-26 PROCEDURE — 83036 HEMOGLOBIN GLYCOSYLATED A1C: CPT

## 2025-03-26 PROCEDURE — 82671 ASSAY OF ESTROGENS: CPT

## 2025-03-26 PROCEDURE — 80061 LIPID PANEL: CPT

## 2025-03-26 PROCEDURE — 85025 COMPLETE CBC W/AUTO DIFF WBC: CPT

## 2025-03-26 PROCEDURE — 84403 ASSAY OF TOTAL TESTOSTERONE: CPT

## 2025-03-26 PROCEDURE — 36415 COLL VENOUS BLD VENIPUNCTURE: CPT

## 2025-03-27 LAB
FSH SERPL-ACNC: 133 MIU/ML
LH SERPL-ACNC: 59.4 MIU/ML

## 2025-03-28 LAB — TESTOST SERPL-MCNC: 13 NG/DL (ref 3–41)

## 2025-03-31 LAB
ESTRADIOL SERPL HS-MCNC: 6.3 PG/ML
ESTROGEN SERPL CALC-MCNC: 20.7 PG/ML
ESTRONE SERPL-MCNC: 14.4 PG/ML

## 2025-06-23 ENCOUNTER — HOSPITAL ENCOUNTER (OUTPATIENT)
Facility: HOSPITAL | Age: 65
Discharge: HOME | End: 2025-06-23
Payer: MEDICARE

## 2025-06-23 LAB
25(OH)D3 SERPL-MCNC: 93.2 NG/ML (ref 32–100)
ALBUMIN SERPL-MCNC: 4.1 G/DL (ref 3.4–4.8)
ALBUMIN/GLOB SERPL: 2 {RATIO} (ref 0.8–2)
ALP SERPL-CCNC: 127 U/L (ref 25–100)
ALT SERPL-CCNC: 26 U/L (ref 4–36)
ANION GAP SERPL CALCULATED.3IONS-SCNC: 14 MMOL/L (ref 3–16)
AST SERPL-CCNC: 28 U/L (ref 8–33)
BASOPHILS # BLD: 0 K/UL (ref 0–0.1)
BASOPHILS NFR BLD: 0.6 %
BILIRUB SERPL-MCNC: 0.5 MG/DL (ref 0.3–1.2)
BUN SERPL-MCNC: 21 MG/DL (ref 6–20)
CALCIUM SERPL-MCNC: 9.7 MG/DL (ref 8.3–10.6)
CHLORIDE SERPL-SCNC: 106 MMOL/L (ref 98–107)
CHOLEST SERPL-MCNC: 134 MG/DL (ref 0–200)
CO2 SERPL-SCNC: 21 MMOL/L (ref 20–30)
CREAT SERPL-MCNC: 1.6 MG/DL (ref 0.6–1.2)
CREAT UR-MCNC: 192 MG/DL (ref 28–259)
EOSINOPHIL # BLD: 0.1 K/UL (ref 0–0.4)
EOSINOPHIL NFR BLD: 2.8 %
ERYTHROCYTE [DISTWIDTH] IN BLOOD BY AUTOMATED COUNT: 12.4 % (ref 11–16)
FOLATE SERPL-MCNC: 4.85 NG/ML
GFR SERPLBLD CREATININE-BSD FMLA CKD-EPI: 36 ML/MIN/{1.73_M2}
GLOBULIN SER CALC-MCNC: 2.1 G/DL
GLUCOSE SERPL-MCNC: 108 MG/DL (ref 74–106)
HBA1C MFR BLD: 5.6 %
HCT VFR BLD AUTO: 41.9 % (ref 37–47)
HDLC SERPL-MCNC: 50 MG/DL (ref 40–60)
HGB BLD-MCNC: 13.4 G/DL (ref 11.5–16.5)
IMM GRANULOCYTES # BLD: 0 K/UL
IMM GRANULOCYTES NFR BLD: 0.2 % (ref 0–5)
LDLC SERPL CALC-MCNC: 66 MG/DL
LYMPHOCYTES # BLD: 1.4 K/UL (ref 1.5–4)
LYMPHOCYTES NFR BLD: 28 %
MCH RBC QN AUTO: 28.6 PG (ref 27–32)
MCHC RBC AUTO-ENTMCNC: 32 G/DL (ref 31–35)
MCV RBC AUTO: 89.3 FL (ref 80–100)
MICROALBUMIN UR DL<=1MG/L-MCNC: <1.2 MG/DL (ref 0–22)
MICROALBUMIN/CREAT UR: NORMAL MG/G (ref 0–30)
MONOCYTES # BLD: 0.3 K/UL (ref 0.2–0.8)
MONOCYTES NFR BLD: 6.7 %
NEUTROPHILS # BLD: 3.1 K/UL (ref 2–7.5)
NEUTS SEG NFR BLD: 61.7 %
PLATELET # BLD AUTO: 176 K/UL (ref 150–400)
PMV BLD AUTO: 11.2 FL (ref 6–10)
POTASSIUM SERPL-SCNC: 4.3 MMOL/L (ref 3.4–5.1)
PROT SERPL-MCNC: 6.2 G/DL (ref 6.4–8.3)
RBC # BLD AUTO: 4.69 M/UL (ref 3.8–5.8)
SODIUM SERPL-SCNC: 141 MMOL/L (ref 136–145)
TRIGL SERPL-MCNC: 88 MG/DL (ref 0–249)
TSH SERPL DL<=0.005 MIU/L-ACNC: 6.09 UIU/ML (ref 0.27–4.2)
VIT B12 SERPL-MCNC: 1110 PG/ML (ref 211–911)
VLDLC SERPL CALC-MCNC: 18 MG/DL
WBC # BLD AUTO: 5.1 K/UL (ref 4–11)

## 2025-06-23 PROCEDURE — 80061 LIPID PANEL: CPT

## 2025-06-23 PROCEDURE — 85025 COMPLETE CBC W/AUTO DIFF WBC: CPT

## 2025-06-23 PROCEDURE — 82570 ASSAY OF URINE CREATININE: CPT

## 2025-06-23 PROCEDURE — 83036 HEMOGLOBIN GLYCOSYLATED A1C: CPT

## 2025-06-23 PROCEDURE — 84443 ASSAY THYROID STIM HORMONE: CPT

## 2025-06-23 PROCEDURE — 80053 COMPREHEN METABOLIC PANEL: CPT

## 2025-06-23 PROCEDURE — 82746 ASSAY OF FOLIC ACID SERUM: CPT

## 2025-06-23 PROCEDURE — 82306 VITAMIN D 25 HYDROXY: CPT

## 2025-06-23 PROCEDURE — 82607 VITAMIN B-12: CPT

## 2025-06-23 PROCEDURE — 82043 UR ALBUMIN QUANTITATIVE: CPT

## 2025-06-23 PROCEDURE — 36415 COLL VENOUS BLD VENIPUNCTURE: CPT

## (undated) DEVICE — CATH DIAG EXPO M/ PK 5F FL4/FR4 PIG

## (undated) DEVICE — MODEL BT2000 P/N 700287-012KIT CONTENTS: MANIFOLD WITH SALINE AND CONTRAST PORTS, SALINE TUBING WITH SPIKE AND HAND SYRINGE, TRANSDUCER: Brand: BT2000 AUTOMATED MANIFOLD KIT

## (undated) DEVICE — MODEL AT P65, P/N 701554-001KIT CONTENTS: HAND CONTROLLER, 3-WAY HIGH-PRESSURE STOPCOCK WITH ROTATING END AND PREMIUM HIGH-PRESSURE TUBING: Brand: ANGIOTOUCH® KIT

## (undated) DEVICE — GW PERIPH GUIDERIGHT STD/EXCHNG/J/TIP SS 0.035IN 5X260CM

## (undated) DEVICE — CATH DIAG EXPO .045 FL3.5 5F 100CM

## (undated) DEVICE — DEV COMP RAD PRELUDESYNC 24CM

## (undated) DEVICE — PK CATH CARD 10

## (undated) DEVICE — GLIDESHEATH SLENDER STAINLESS STEEL KIT: Brand: GLIDESHEATH SLENDER